# Patient Record
Sex: MALE | Race: WHITE | NOT HISPANIC OR LATINO | Employment: OTHER | URBAN - METROPOLITAN AREA
[De-identification: names, ages, dates, MRNs, and addresses within clinical notes are randomized per-mention and may not be internally consistent; named-entity substitution may affect disease eponyms.]

---

## 2023-08-10 DIAGNOSIS — M50.121 DISORDER OF INTERVERTEBRAL DISC AT C4-C5 LEVEL WITH RADICULOPATHY: Primary | ICD-10-CM

## 2023-08-10 DIAGNOSIS — M50.123 DISORDER OF INTERVERTEBRAL DISC AT C6-C7 LEVEL WITH RADICULOPATHY: ICD-10-CM

## 2023-08-10 DIAGNOSIS — M50.122 DISORDER OF INTERVERTEBRAL DISC AT C5-C6 LEVEL WITH RADICULOPATHY: ICD-10-CM

## 2023-08-29 ENCOUNTER — OFFICE VISIT (OUTPATIENT)
Dept: PAIN MEDICINE | Facility: CLINIC | Age: 73
End: 2023-08-29
Payer: OTHER GOVERNMENT

## 2023-08-29 VITALS
TEMPERATURE: 98 F | BODY MASS INDEX: 30.45 KG/M2 | HEART RATE: 71 BPM | HEIGHT: 67 IN | SYSTOLIC BLOOD PRESSURE: 153 MMHG | WEIGHT: 194 LBS | DIASTOLIC BLOOD PRESSURE: 86 MMHG

## 2023-08-29 DIAGNOSIS — M50.122 DISORDER OF INTERVERTEBRAL DISC AT C5-C6 LEVEL WITH RADICULOPATHY: ICD-10-CM

## 2023-08-29 DIAGNOSIS — M50.121 DISORDER OF INTERVERTEBRAL DISC AT C4-C5 LEVEL WITH RADICULOPATHY: Primary | ICD-10-CM

## 2023-08-29 DIAGNOSIS — M50.123 DISORDER OF INTERVERTEBRAL DISC AT C6-C7 LEVEL WITH RADICULOPATHY: ICD-10-CM

## 2023-08-29 DIAGNOSIS — M54.12 CERVICAL RADICULOPATHY: ICD-10-CM

## 2023-08-29 PROCEDURE — 99204 PR OFFICE/OUTPT VISIT, NEW, LEVL IV, 45-59 MIN: ICD-10-PCS | Mod: ,,, | Performed by: NURSE PRACTITIONER

## 2023-08-29 PROCEDURE — 99204 OFFICE O/P NEW MOD 45 MIN: CPT | Mod: ,,, | Performed by: NURSE PRACTITIONER

## 2023-08-29 RX ORDER — TAMSULOSIN HYDROCHLORIDE 0.4 MG/1
0.4 CAPSULE ORAL NIGHTLY
COMMUNITY

## 2023-08-29 RX ORDER — NAPROXEN SODIUM 220 MG/1
81 TABLET, FILM COATED ORAL DAILY
COMMUNITY

## 2023-08-29 RX ORDER — AMLODIPINE AND BENAZEPRIL HYDROCHLORIDE 10; 20 MG/1; MG/1
1 CAPSULE ORAL DAILY
COMMUNITY

## 2023-08-29 RX ORDER — ALBUTEROL SULFATE 0.83 MG/ML
2.5 SOLUTION RESPIRATORY (INHALATION) EVERY 6 HOURS PRN
COMMUNITY

## 2023-08-29 RX ORDER — METOPROLOL SUCCINATE 25 MG/1
25 TABLET, EXTENDED RELEASE ORAL DAILY
COMMUNITY

## 2023-08-29 RX ORDER — CETIRIZINE HYDROCHLORIDE 10 MG/1
10 TABLET ORAL DAILY
COMMUNITY

## 2023-08-29 RX ORDER — ESCITALOPRAM OXALATE 20 MG/1
20 TABLET ORAL
COMMUNITY

## 2023-08-29 RX ORDER — LOSARTAN POTASSIUM 100 MG/1
100 TABLET ORAL DAILY
COMMUNITY

## 2023-08-29 RX ORDER — FLUTICASONE PROPIONATE AND SALMETEROL 100; 50 UG/1; UG/1
1 POWDER RESPIRATORY (INHALATION) 2 TIMES DAILY PRN
COMMUNITY

## 2023-08-29 RX ORDER — ROSUVASTATIN CALCIUM 5 MG/1
5 TABLET, COATED ORAL NIGHTLY
COMMUNITY

## 2023-08-29 RX ORDER — OMEPRAZOLE 40 MG/1
40 CAPSULE, DELAYED RELEASE ORAL DAILY
COMMUNITY

## 2023-08-29 NOTE — PROGRESS NOTES
Subjective:      Patient ID: Justin Del Cid is a 73 y.o. male.    Chief Complaint: Neck Pain (Referral from Dr Murray, needs to have cervical injection. Doing PT for neck which did help some. Taking OTC. Having the pain for the last year. [facility] Pain level today 7/10. No numbness or tingling.)    Referred by: Alessandro Murray MD     HPI:  This is a pleasant 73-year-old male patient presenting as a new consult for pain with cervical radiculopathy specifically due to disorders of enter vertebral disc at C4-C5, C5-C6, and C6-C7 level.  Dr. Murray reviewed studies of patient's neck from [facility] 15, 2023.  There was no severe central canal stenosis at any level.  He is mild degenerative disc bulging at C3-4, C4-5, C5-6 and C6-7.  Dr. Murray impression was his cervical MRI shows mild degenerative changes in the neck consistent with a spine age 72 years.  Stenosis or narrowing in the cervical spinal canal is not severe enough to warrant any surgical intervention upon cervical spine at this point.  Thus I have issued a prescription for the patient to undergo an outpatient physical therapy and epidural steroid injections to his neck.    Patient's pain is located to the back of his neck.  This is been going on for approximately a year after he fell a year and a half ago.  His neck pain is worse when he looks to the left side this is a problem when he is driving.  It is also aggravated with fishing and sleeping.  These activities will elevate his pain to 8/10.  Pain will reduce to 04-5/10 when he takes Aleve.  He will take 4-6 Aleve every day.  In the past he has tried Flexeril with minimal to no relief.  He denies urinary retention, fecal incontinence, saddle anesthesia in any abnormal gait.  His handwriting has not changed he has no radiation of pain down his arms.  His current pain score today is 7/10.    No history of prior spinal surgery to his neck, no implanted pacemaker, defibrillator or spinal cord  "stimulator.  He does have pertinent past medical history of a prior myocardial infarction and was placed on 81 mg of baby aspirin as a secondary prevention her his cardiologist Dr. Lehman at [facility]  Vital signs:   Vitals:    08/29/23 1325   BP: (!) 153/86   Pulse: 71   Temp: 97.7 °F (36.5 °C)   Weight: 88 kg (194 lb)   Height: 5' 7" (1.702 m)   PainSc:   7     Body mass index is 30.38 kg/m².  Pain Disability Index (PDI): 48       Interventional Pain History  Prior lumbar RIC only    ROS: Neck pain    MRI cervical spine 2023:    Please see Dr. Alessandro Magana's referrral summary for details          Objective:          Physical Exam  General: Well developed; normal weight. ; A&O x 3; No anxiety/depression; NAD  Mental Status: Oriented to person, palce and time. Displays appropriate mood & affect.  Head: Norm cephalic and atraumatic  Neck:  + bilateral cervical paraspinal banding. Limited ROM neck turning to the left  Full range of motion with lateral turning to right and with cervical flexion . Pain with cervical +extension.able to raise both arms over head  Eyes: normal conjunctiva, normal lids, normal pupils  ENT and mouth: normal external ear, nose, and no lesions noted on the lips.  Respiratory: Symmetrical, Unlabored. No dyspnea  CV: normal rhythm and rate. No peripheral edema.   Abdomen: Non-distended    Extremities:  Gen: No cyanosis or tenderness to palpation bilateral upper and lower extremities  Skin: Warm, pink, dry, no rashes, no lesions on the lumbar spine  Strength: 5/5 motor strength bilateral upper and lower extremities  ROM: Full ROM in bilateral knees and ankles without pain or instability.    Neuro:  Gait: no altalgic lean, normal toe and heel raise. Independent ambulator.  DTR's: 2+ in bilateral patellar, and ankle  Sensory: Intact to light touch bilateral  upper and lower extremities    Spine: Normal lordosis. No scoliosis  L-spine ROM: Full ROM to flexion, extension, bilateral rotation, "   Straight Leg Raise:  Positive right, positive left  SI Joint: No tenderness to palpation bilaterally.           Past Medical History:   Diagnosis Date    Anxiety     COPD (chronic obstructive pulmonary disease)     Coronary artery disease     GERD (gastroesophageal reflux disease)     Hyperlipidemia     Hypertension     Obsessive-compulsive disorder     Prostate enlargement     Sinusitis        Past Surgical History:   Procedure Laterality Date    ABDOMINAL SURGERY      HERNIA REPAIR         Family History   Problem Relation Age of Onset    No Known Problems Mother     Cancer Father     COPD Sister        Social History     Socioeconomic History    Marital status:    Tobacco Use    Smoking status: Every Day     Current packs/day: 1.00     Average packs/day: 1 pack/day for 48.7 years (48.7 ttl pk-yrs)     Types: Cigarettes, Cigars     Start date: 1975    Smokeless tobacco: Never   Substance and Sexual Activity    Alcohol use: Not Currently    Drug use: Never    Sexual activity: Not Currently       Current Outpatient Medications   Medication Sig Dispense Refill    albuterol (PROVENTIL) 2.5 mg /3 mL (0.083 %) nebulizer solution Take 2.5 mg by nebulization every 6 (six) hours as needed for Wheezing. Rescue      amlodipine-benazepril 10-20mg (LOTREL) 10-20 mg per capsule Take 1 capsule by mouth once daily.      aspirin 81 MG Chew Take 81 mg by mouth once daily.      cetirizine (ZYRTEC) 10 MG tablet Take 10 mg by mouth once daily.      EScitalopram oxalate (LEXAPRO) 20 MG tablet Take 20 mg by mouth once daily.      fluticasone-salmeterol diskus inhaler 100-50 mcg Inhale 1 puff into the lungs 2 (two) times daily. Controller      losartan (COZAAR) 100 MG tablet Take 100 mg by mouth once daily.      metoprolol succinate (TOPROL-XL) 25 MG 24 hr tablet Take 25 mg by mouth once daily.      omeprazole (PRILOSEC) 40 MG capsule Take 40 mg by mouth once daily.      rosuvastatin (CRESTOR) 5 MG tablet Take 5 mg by mouth once  daily.      tamsulosin (FLOMAX) 0.4 mg Cap Take by mouth once daily.       No current facility-administered medications for this visit.       Review of patient's allergies indicates:   Allergen Reactions    Shrimp Rash           Assessment:     A/P:  Patient has notable posterior neck pain with occasional posterior headaches when he is lying down at night.  He also hears cracking noises when he turns the neck and has limited range of motion turning at to the left and more pain with looking up.  He has no radiation to his arms or hands, only  to the right trapezius.  He also takes 4-6 Aleve every day and a 81 mg aspirin.  He has completed physical therapy for his neck is for 2 weeks this would working initially and then it flared his pain up significantly causing more bone pain    Request sent for Intralaminar Cervical RIC to C7-T1  Clearance needed from Cardiologist to hold ASA 81mg prior to injection as he had a prior myocardial infarction.   [facility]  Patient to follow up for pre-op visit.   Future consideration for diagnostic cervical MBBs  Encounter Diagnoses   Name Primary?    Disorder of intervertebral disc at C4-C5 level with radiculopathy Yes    Disorder of intervertebral disc at C5-C6 level with radiculopathy     Disorder of intervertebral disc at C6-C7 level with radiculopathy     Cervical radiculopathy          Plan:       Justin was seen today for neck pain.    Diagnoses and all orders for this visit:    Disorder of intervertebral disc at C4-C5 level with radiculopathy  -     Ambulatory referral/consult to Pain Clinic    Disorder of intervertebral disc at C5-C6 level with radiculopathy  -     Ambulatory referral/consult to Pain Clinic    Disorder of intervertebral disc at C6-C7 level with radiculopathy  -     Ambulatory referral/consult to Pain Clinic    Cervical radiculopathy

## 2023-08-29 NOTE — LETTER
Cardiac Risk Assessment Request Form      Date: 08/29/23      Patient's Name: Justin Del Cid     YOB: 1950    Patient is scheduled to have Cervical epidural steroid injection on 10/11/2023 by Dr. Calixto with (check one):    _ General  XX  Local/regional  XX  MAC  _ Conscious Sedation Anesthesia    Dx: Cervical Disc Diease (please no ICD-10 code)    Requesting staff name: Surekha Gibbons RENETTAN     Phone number: [phone]        Fax number: [phone]    Check all that apply and complete blanks:    XX  Request for cardiac risk assessment for procedure  XX Request to hold _ASA___ for __7__ days prior to procedure  _ Pre-procedure antibiotics: Cardiac status information  _ Major dental procedure  _  Additional records requested: ___________________________________________     ** Please fax document back to [phone] or [phone]   ATTN: [facility] ([facility]) and allow at lease 3 business days to receive a response from [facility].   According to American College of [employer]iology cardiac risk assessment, low risk surgeries do not need cardiac testing or risk stratification. Patients that are asymptomatic should safely proceed with low risk procedures that may include, but are not limited to dental procedure, minor skin procedures, EGD, Colonoscopy or Cataracts.   Symptomatic patients should make an appointment with [facility].

## 2023-09-28 RX ORDER — AMLODIPINE BESYLATE 10 MG/1
10 TABLET ORAL DAILY
COMMUNITY

## 2023-09-28 RX ORDER — NAPROXEN SODIUM 220 MG
220 TABLET ORAL 2 TIMES DAILY WITH MEALS
COMMUNITY

## 2023-10-03 ENCOUNTER — OFFICE VISIT (OUTPATIENT)
Dept: PAIN MEDICINE | Facility: CLINIC | Age: 73
End: 2023-10-03
Payer: OTHER GOVERNMENT

## 2023-10-03 VITALS
WEIGHT: 190 LBS | DIASTOLIC BLOOD PRESSURE: 80 MMHG | HEIGHT: 67 IN | SYSTOLIC BLOOD PRESSURE: 121 MMHG | HEART RATE: 76 BPM | BODY MASS INDEX: 29.82 KG/M2

## 2023-10-03 DIAGNOSIS — M54.2 CERVICALGIA: Primary | ICD-10-CM

## 2023-10-03 DIAGNOSIS — M50.30 DDD (DEGENERATIVE DISC DISEASE), CERVICAL: ICD-10-CM

## 2023-10-03 DIAGNOSIS — M54.12 CERVICAL RADICULITIS: ICD-10-CM

## 2023-10-03 PROCEDURE — 99214 OFFICE O/P EST MOD 30 MIN: CPT | Mod: ,,, | Performed by: ANESTHESIOLOGY

## 2023-10-03 PROCEDURE — 99214 PR OFFICE/OUTPT VISIT, EST, LEVL IV, 30-39 MIN: ICD-10-PCS | Mod: ,,, | Performed by: ANESTHESIOLOGY

## 2023-10-03 RX ORDER — SILDENAFIL 100 MG/1
50 TABLET, FILM COATED ORAL
COMMUNITY
Start: 2022-11-09

## 2023-10-03 RX ORDER — OMEPRAZOLE 20 MG/1
40 CAPSULE, DELAYED RELEASE ORAL
COMMUNITY
Start: 2023-02-01

## 2023-10-03 NOTE — H&P (VIEW-ONLY)
Subjective:      Patient ID: Justin Del Cid is a 73 y.o. male.    Chief Complaint: Pre-op Exam and Neck Pain (Pre op [facility] C7-T1 10/11/23 C/O pain level 7-8, Taking Aleve for pain. )    Referred by: Mayra Wilburn He*       Neck Pain   :  This is a pleasant 73-year-old male patient presenting with cervical radiculopathy specifically due to disorders of enter vertebral disc at C4-C5, C5-C6, and C6-C7 level.  Dr. Murray reviewed studies of patient's neck from [facility] 15, 2023.  There was no severe central canal stenosis at any level.  He is mild degenerative disc bulging at C3-4, C4-5, C5-6 and C6-7.  Dr. Murray impression was his cervical MRI shows mild degenerative changes in the neck consistent with a spine age 72 years.  Stenosis or narrowing in the cervical spinal canal is not severe enough to warrant any surgical intervention upon cervical spine at this point.  Thus I have issued a prescription for the patient to undergo an outpatient physical therapy and epidural steroid injections to his neck.    Patient's pain is located to the back of his neck.  This is been going on for approximately a year after he fell a year and a half ago.  His neck pain is worse when he looks to the left side this is a problem when he is driving.  It is also aggravated with fishing and sleeping.  These activities will elevate his pain to 8/10.  Pain will reduce to 04-5/10 when he takes Aleve.  He will take 4-6 Aleve every day.  In the past he has tried Flexeril with minimal to no relief.  He denies urinary retention, fecal incontinence, saddle anesthesia in any abnormal gait.  His handwriting has not changed he has no radiation of pain down his arms.  His current pain score today is 7/10.    No history of prior spinal surgery to his neck, no implanted pacemaker, defibrillator or spinal cord stimulator.  He does have pertinent past medical history of a prior myocardial infarction and was placed on 81 mg of baby aspirin as a  "secondary prevention her his cardiologist Dr. Lehman at [facility]  Vital signs:   Vitals:    10/03/23 1321 10/03/23 1323   BP: 121/80    Pulse: 76    Weight: 86.2 kg (190 lb)    Height: 5' 7" (1.702 m)    PainSc:    8     Body mass index is 29.76 kg/m².  Pain Disability Index (PDI): 43       Interventional Pain History  Prior lumbar RIC only    Review of Systems   Musculoskeletal:  Positive for neck pain.   : Neck pain    MRI cervical spine 2023:    Please see Dr. Alessandro Magana's referrral summary for details          Objective:          Physical Exam  General: Well developed; normal weight. ; A&O x 3; No anxiety/depression; NAD  Mental Status: Oriented to person, palce and time. Displays appropriate mood & affect.  Head: Norm cephalic and atraumatic  Neck:  + bilateral cervical paraspinal banding. Limited ROM neck turning to the left  Full range of motion with lateral turning to right and with cervical flexion . Pain with cervical +extension.able to raise both arms over head  Eyes: normal conjunctiva, normal lids, normal pupils  ENT and mouth: normal external ear, nose, and no lesions noted on the lips.  Respiratory: Symmetrical, Unlabored. No dyspnea  CV: normal rhythm and rate. No peripheral edema.   Abdomen: Non-distended    Extremities:  Gen: No cyanosis or tenderness to palpation bilateral upper and lower extremities  Skin: Warm, pink, dry, no rashes, no lesions on the lumbar spine  Strength: 5/5 motor strength bilateral upper and lower extremities  ROM: Full ROM in bilateral knees and ankles without pain or instability.    Neuro:  Gait: no altalgic lean, normal toe and heel raise. Independent ambulator.  DTR's: 2+ in bilateral patellar, and ankle  Sensory: Intact to light touch bilateral  upper and lower extremities    Spine: Normal lordosis. No scoliosis  L-spine ROM: Full ROM to flexion, extension, bilateral rotation,   Straight Leg Raise:  Positive right, positive left  SI Joint: No tenderness to " palpation bilaterally.           Past Medical History:   Diagnosis Date    Anxiety     COPD (chronic obstructive pulmonary disease)     Coronary artery disease     GERD (gastroesophageal reflux disease)     Hyperlipidemia     Hypertension     Obsessive-compulsive disorder     Prostate enlargement     Sinusitis        Past Surgical History:   Procedure Laterality Date    ABDOMINAL SURGERY      CARDIAC CATHETERIZATION      CATARACT EXTRACTION W/  INTRAOCULAR LENS IMPLANT Bilateral     COLONOSCOPY      HERNIA REPAIR         Family History   Problem Relation Age of Onset    No Known Problems Mother     Cancer Father     COPD Sister        Social History     Socioeconomic History    Marital status:    Tobacco Use    Smoking status: Every Day     Current packs/day: 1.00     Average packs/day: 1 pack/day for 48.8 years (48.8 ttl pk-yrs)     Types: Cigarettes     Start date: 1975    Smokeless tobacco: Never   Substance and Sexual Activity    Alcohol use: Not Currently    Drug use: Never    Sexual activity: Yes       Current Outpatient Medications   Medication Sig Dispense Refill    albuterol (PROVENTIL) 2.5 mg /3 mL (0.083 %) nebulizer solution Take 2.5 mg by nebulization every 6 (six) hours as needed for Wheezing. Rescue      amLODIPine (NORVASC) 10 MG tablet Take 10 mg by mouth once daily.      amlodipine-benazepril 10-20mg (LOTREL) 10-20 mg per capsule Take 1 capsule by mouth once daily.      aspirin 81 MG Chew Take 81 mg by mouth once daily.      cetirizine (ZYRTEC) 10 MG tablet Take 10 mg by mouth once daily.      EScitalopram oxalate (LEXAPRO) 20 MG tablet Take 20 mg by mouth as needed.      fluticasone-salmeterol diskus inhaler 100-50 mcg Inhale 1 puff into the lungs 2 (two) times daily as needed. Controller      losartan (COZAAR) 100 MG tablet Take 100 mg by mouth once daily.      metoprolol succinate (TOPROL-XL) 25 MG 24 hr tablet Take 25 mg by mouth once daily.      naproxen sodium (ANAPROX) 220 MG tablet  Take 220 mg by mouth 2 (two) times daily with meals.      omeprazole (PRILOSEC) 20 MG capsule 40 mg.      omeprazole (PRILOSEC) 40 MG capsule Take 40 mg by mouth once daily.      rosuvastatin (CRESTOR) 5 MG tablet Take 5 mg by mouth every evening.      sildenafiL (VIAGRA) 100 MG tablet 50 mg.      tamsulosin (FLOMAX) 0.4 mg Cap Take 0.4 mg by mouth nightly.       No current facility-administered medications for this visit.       Review of patient's allergies indicates:   Allergen Reactions    Iodine     Shrimp Rash           Assessment:     A/P:  Patient has notable posterior neck pain with occasional posterior headaches when he is lying down at night.  He also hears cracking noises when he turns the neck and has limited range of motion turning at to the left and more pain with looking up.  He has no radiation to his arms or hands, only  to the right trapezius.  He also takes 4-6 Aleve every day and a 81 mg aspirin.  He has completed physical therapy for his neck is for 2 weeks this would working initially and then it flared his pain up significantly causing more bone pain    Encounter Diagnoses   Name Primary?    Cervicalgia Yes    Cervical radiculitis     DDD (degenerative disc disease), cervical          Plan:       Justin was seen today for pre-op exam and neck pain.    Diagnoses and all orders for this visit:    Cervicalgia    Cervical radiculitis    DDD (degenerative disc disease), cervical       Scheduled for [facility] next week. Plan discussed and he wishes to proceed.

## 2023-10-03 NOTE — PROGRESS NOTES
Subjective:      Patient ID: Justin Del Cid is a 73 y.o. male.    Chief Complaint: Pre-op Exam and Neck Pain (Pre op [facility] C7-T1 10/11/23 C/O pain level 7-8, Taking Aleve for pain. )    Referred by: Mayra Wilburn He*       Neck Pain   :  This is a pleasant 73-year-old male patient presenting with cervical radiculopathy specifically due to disorders of enter vertebral disc at C4-C5, C5-C6, and C6-C7 level.  Dr. Murray reviewed studies of patient's neck from [facility] 15, 2023.  There was no severe central canal stenosis at any level.  He is mild degenerative disc bulging at C3-4, C4-5, C5-6 and C6-7.  Dr. Murray impression was his cervical MRI shows mild degenerative changes in the neck consistent with a spine age 72 years.  Stenosis or narrowing in the cervical spinal canal is not severe enough to warrant any surgical intervention upon cervical spine at this point.  Thus I have issued a prescription for the patient to undergo an outpatient physical therapy and epidural steroid injections to his neck.    Patient's pain is located to the back of his neck.  This is been going on for approximately a year after he fell a year and a half ago.  His neck pain is worse when he looks to the left side this is a problem when he is driving.  It is also aggravated with fishing and sleeping.  These activities will elevate his pain to 8/10.  Pain will reduce to 04-5/10 when he takes Aleve.  He will take 4-6 Aleve every day.  In the past he has tried Flexeril with minimal to no relief.  He denies urinary retention, fecal incontinence, saddle anesthesia in any abnormal gait.  His handwriting has not changed he has no radiation of pain down his arms.  His current pain score today is 7/10.    No history of prior spinal surgery to his neck, no implanted pacemaker, defibrillator or spinal cord stimulator.  He does have pertinent past medical history of a prior myocardial infarction and was placed on 81 mg of baby aspirin as a  "secondary prevention her his cardiologist Dr. Lehman at [facility]  Vital signs:   Vitals:    10/03/23 1321 10/03/23 1323   BP: 121/80    Pulse: 76    Weight: 86.2 kg (190 lb)    Height: 5' 7" (1.702 m)    PainSc:    8     Body mass index is 29.76 kg/m².  Pain Disability Index (PDI): 43       Interventional Pain History  Prior lumbar RIC only    Review of Systems   Musculoskeletal:  Positive for neck pain.   : Neck pain    MRI cervical spine 2023:    Please see Dr. Alessandro Magana's referrral summary for details          Objective:          Physical Exam  General: Well developed; normal weight. ; A&O x 3; No anxiety/depression; NAD  Mental Status: Oriented to person, palce and time. Displays appropriate mood & affect.  Head: Norm cephalic and atraumatic  Neck:  + bilateral cervical paraspinal banding. Limited ROM neck turning to the left  Full range of motion with lateral turning to right and with cervical flexion . Pain with cervical +extension.able to raise both arms over head  Eyes: normal conjunctiva, normal lids, normal pupils  ENT and mouth: normal external ear, nose, and no lesions noted on the lips.  Respiratory: Symmetrical, Unlabored. No dyspnea  CV: normal rhythm and rate. No peripheral edema.   Abdomen: Non-distended    Extremities:  Gen: No cyanosis or tenderness to palpation bilateral upper and lower extremities  Skin: Warm, pink, dry, no rashes, no lesions on the lumbar spine  Strength: 5/5 motor strength bilateral upper and lower extremities  ROM: Full ROM in bilateral knees and ankles without pain or instability.    Neuro:  Gait: no altalgic lean, normal toe and heel raise. Independent ambulator.  DTR's: 2+ in bilateral patellar, and ankle  Sensory: Intact to light touch bilateral  upper and lower extremities    Spine: Normal lordosis. No scoliosis  L-spine ROM: Full ROM to flexion, extension, bilateral rotation,   Straight Leg Raise:  Positive right, positive left  SI Joint: No tenderness to " palpation bilaterally.           Past Medical History:   Diagnosis Date    Anxiety     COPD (chronic obstructive pulmonary disease)     Coronary artery disease     GERD (gastroesophageal reflux disease)     Hyperlipidemia     Hypertension     Obsessive-compulsive disorder     Prostate enlargement     Sinusitis        Past Surgical History:   Procedure Laterality Date    ABDOMINAL SURGERY      CARDIAC CATHETERIZATION      CATARACT EXTRACTION W/  INTRAOCULAR LENS IMPLANT Bilateral     COLONOSCOPY      HERNIA REPAIR         Family History   Problem Relation Age of Onset    No Known Problems Mother     Cancer Father     COPD Sister        Social History     Socioeconomic History    Marital status:    Tobacco Use    Smoking status: Every Day     Current packs/day: 1.00     Average packs/day: 1 pack/day for 48.8 years (48.8 ttl pk-yrs)     Types: Cigarettes     Start date: 1975    Smokeless tobacco: Never   Substance and Sexual Activity    Alcohol use: Not Currently    Drug use: Never    Sexual activity: Yes       Current Outpatient Medications   Medication Sig Dispense Refill    albuterol (PROVENTIL) 2.5 mg /3 mL (0.083 %) nebulizer solution Take 2.5 mg by nebulization every 6 (six) hours as needed for Wheezing. Rescue      amLODIPine (NORVASC) 10 MG tablet Take 10 mg by mouth once daily.      amlodipine-benazepril 10-20mg (LOTREL) 10-20 mg per capsule Take 1 capsule by mouth once daily.      aspirin 81 MG Chew Take 81 mg by mouth once daily.      cetirizine (ZYRTEC) 10 MG tablet Take 10 mg by mouth once daily.      EScitalopram oxalate (LEXAPRO) 20 MG tablet Take 20 mg by mouth as needed.      fluticasone-salmeterol diskus inhaler 100-50 mcg Inhale 1 puff into the lungs 2 (two) times daily as needed. Controller      losartan (COZAAR) 100 MG tablet Take 100 mg by mouth once daily.      metoprolol succinate (TOPROL-XL) 25 MG 24 hr tablet Take 25 mg by mouth once daily.      naproxen sodium (ANAPROX) 220 MG tablet  Take 220 mg by mouth 2 (two) times daily with meals.      omeprazole (PRILOSEC) 20 MG capsule 40 mg.      omeprazole (PRILOSEC) 40 MG capsule Take 40 mg by mouth once daily.      rosuvastatin (CRESTOR) 5 MG tablet Take 5 mg by mouth every evening.      sildenafiL (VIAGRA) 100 MG tablet 50 mg.      tamsulosin (FLOMAX) 0.4 mg Cap Take 0.4 mg by mouth nightly.       No current facility-administered medications for this visit.       Review of patient's allergies indicates:   Allergen Reactions    Iodine     Shrimp Rash           Assessment:     A/P:  Patient has notable posterior neck pain with occasional posterior headaches when he is lying down at night.  He also hears cracking noises when he turns the neck and has limited range of motion turning at to the left and more pain with looking up.  He has no radiation to his arms or hands, only  to the right trapezius.  He also takes 4-6 Aleve every day and a 81 mg aspirin.  He has completed physical therapy for his neck is for 2 weeks this would working initially and then it flared his pain up significantly causing more bone pain    Encounter Diagnoses   Name Primary?    Cervicalgia Yes    Cervical radiculitis     DDD (degenerative disc disease), cervical          Plan:       Justin was seen today for pre-op exam and neck pain.    Diagnoses and all orders for this visit:    Cervicalgia    Cervical radiculitis    DDD (degenerative disc disease), cervical       Scheduled for [facility] next week. Plan discussed and he wishes to proceed.

## 2023-10-04 ENCOUNTER — ANESTHESIA EVENT (OUTPATIENT)
Dept: SURGERY | Facility: HOSPITAL | Age: 73
End: 2023-10-04
Payer: OTHER GOVERNMENT

## 2023-10-11 ENCOUNTER — HOSPITAL ENCOUNTER (OUTPATIENT)
Facility: HOSPITAL | Age: 73
Discharge: HOME | End: 2023-10-11
Attending: ANESTHESIOLOGY | Admitting: ANESTHESIOLOGY
Payer: OTHER GOVERNMENT

## 2023-10-11 ENCOUNTER — ANESTHESIA (OUTPATIENT)
Dept: SURGERY | Facility: HOSPITAL | Age: 73
End: 2023-10-11
Payer: OTHER GOVERNMENT

## 2023-10-11 DIAGNOSIS — G89.29 CHRONIC NECK PAIN: ICD-10-CM

## 2023-10-11 DIAGNOSIS — M54.2 CHRONIC NECK PAIN: ICD-10-CM

## 2023-10-11 PROCEDURE — 62321 NJX INTERLAMINAR CRV/THRC: CPT | Mod: ,,, | Performed by: ANESTHESIOLOGY

## 2023-10-11 PROCEDURE — D9220A PRA ANESTHESIA: Mod: CRNA,,,

## 2023-10-11 PROCEDURE — D9220A PRA ANESTHESIA: ICD-10-PCS | Mod: ANES,,, | Performed by: ANESTHESIOLOGY

## 2023-10-11 PROCEDURE — 37000008 HC ANESTHESIA 1ST 15 MINUTES: Performed by: ANESTHESIOLOGY

## 2023-10-11 PROCEDURE — 25000003 PHARM REV CODE 250

## 2023-10-11 PROCEDURE — D9220A PRA ANESTHESIA: ICD-10-PCS | Mod: CRNA,,,

## 2023-10-11 PROCEDURE — 62321 NJX INTERLAMINAR CRV/THRC: CPT | Performed by: ANESTHESIOLOGY

## 2023-10-11 PROCEDURE — A4216 STERILE WATER/SALINE, 10 ML: HCPCS | Performed by: ANESTHESIOLOGY

## 2023-10-11 PROCEDURE — 63600175 PHARM REV CODE 636 W HCPCS

## 2023-10-11 PROCEDURE — 63600175 PHARM REV CODE 636 W HCPCS: Performed by: ANESTHESIOLOGY

## 2023-10-11 PROCEDURE — D9220A PRA ANESTHESIA: Mod: ANES,,, | Performed by: ANESTHESIOLOGY

## 2023-10-11 PROCEDURE — 62321 PR INJ CERV/THORAC, W/GUIDANCE: ICD-10-PCS | Mod: ,,, | Performed by: ANESTHESIOLOGY

## 2023-10-11 PROCEDURE — 25000003 PHARM REV CODE 250: Performed by: ANESTHESIOLOGY

## 2023-10-11 RX ORDER — SODIUM CHLORIDE 9 MG/ML
INJECTION, SOLUTION INTRAMUSCULAR; INTRAVENOUS; SUBCUTANEOUS
Status: DISCONTINUED | OUTPATIENT
Start: 2023-10-11 | End: 2023-10-11 | Stop reason: HOSPADM

## 2023-10-11 RX ORDER — PROPOFOL 10 MG/ML
VIAL (ML) INTRAVENOUS
Status: DISCONTINUED | OUTPATIENT
Start: 2023-10-11 | End: 2023-10-11

## 2023-10-11 RX ORDER — DEXAMETHASONE SODIUM PHOSPHATE 10 MG/ML
INJECTION INTRAMUSCULAR; INTRAVENOUS
Status: DISCONTINUED | OUTPATIENT
Start: 2023-10-11 | End: 2023-10-11 | Stop reason: HOSPADM

## 2023-10-11 RX ORDER — DEXAMETHASONE SODIUM PHOSPHATE 10 MG/ML
INJECTION INTRAMUSCULAR; INTRAVENOUS
Status: DISCONTINUED
Start: 2023-10-11 | End: 2023-10-11 | Stop reason: HOSPADM

## 2023-10-11 RX ORDER — BUPIVACAINE HYDROCHLORIDE 2.5 MG/ML
INJECTION, SOLUTION EPIDURAL; INFILTRATION; INTRACAUDAL
Status: DISCONTINUED
Start: 2023-10-11 | End: 2023-10-11 | Stop reason: HOSPADM

## 2023-10-11 RX ORDER — LIDOCAINE HYDROCHLORIDE 20 MG/ML
INJECTION INTRAVENOUS
Status: DISCONTINUED | OUTPATIENT
Start: 2023-10-11 | End: 2023-10-11

## 2023-10-11 RX ORDER — LIDOCAINE HYDROCHLORIDE 10 MG/ML
INJECTION, SOLUTION EPIDURAL; INFILTRATION; INTRACAUDAL; PERINEURAL
Status: DISCONTINUED | OUTPATIENT
Start: 2023-10-11 | End: 2023-10-11 | Stop reason: HOSPADM

## 2023-10-11 RX ORDER — LIDOCAINE HYDROCHLORIDE 10 MG/ML
INJECTION INFILTRATION; PERINEURAL
Status: DISCONTINUED
Start: 2023-10-11 | End: 2023-10-11 | Stop reason: HOSPADM

## 2023-10-11 RX ADMIN — PROPOFOL 100 MG: 10 INJECTION, EMULSION INTRAVENOUS at 11:10

## 2023-10-11 RX ADMIN — LIDOCAINE HYDROCHLORIDE 50 MG: 20 INJECTION INTRAVENOUS at 11:10

## 2023-10-11 NOTE — TRANSFER OF CARE
"Anesthesia Transfer of Care Note    Patient: Justin Del Cid    Procedure(s) Performed: Procedure(s) (LRB):  Injection-steroid-epidural-cervical (N/A)    Patient location: OPS    Anesthesia Type: MAC    Transport from OR: Transported from OR on room air with adequate spontaneous ventilation    Post pain: adequate analgesia    Post assessment: no apparent anesthetic complications    Post vital signs: stable    Level of consciousness: awake    Nausea/Vomiting: no nausea/vomiting    Complications: none    Transfer of care protocol was followed      Last vitals:   Visit Vitals  BP (!) 146/84   Pulse 66   Temp 35.8 °C (96.5 °F) (Tympanic)   Resp 20   Ht 5' 7" (1.702 m)   Wt 88.8 kg (195 lb 12.3 oz)   SpO2 97%   BMI 30.66 kg/m²     "

## 2023-10-11 NOTE — DISCHARGE SUMMARY
[facility] - Periop Services  Discharge Note  Short Stay    Procedure(s) (LRB):  Injection-steroid-epidural-cervical (N/A)      OUTCOME: Patient tolerated treatment/procedure well without complication and is now ready for discharge.    DISPOSITION: Home or Self Care    FINAL DIAGNOSIS:  <principal problem not specified>    FOLLOWUP: In clinic    DISCHARGE INSTRUCTIONS:  No discharge procedures on file.     TIME SPENT ON DISCHARGE: 5 minutes

## 2023-10-11 NOTE — ANESTHESIA PREPROCEDURE EVALUATION
10/11/2023  Justin Del Cid is a 73 y.o., male.  Injection-steroid-epidural-cervical (Spine Cervical)    Pre-op Assessment    I have reviewed the Patient Summary Reports.     I have reviewed the Nursing Notes. I have reviewed the NPO Status.   I have reviewed the Medications.     Review of Systems  Anesthesia Hx:  No problems with previous Anesthesia    Hematology/Oncology:  Hematology Normal   Oncology Normal     EENT/Dental:EENT/Dental Normal   Cardiovascular:   Exercise tolerance: good Hypertension CAD    Functional Capacity 2 METS  Hypertension    Pulmonary:   COPD, mild    Renal/:   Denies Chronic Renal Disease.     Hepatic/GI:   GERD    Musculoskeletal:   Arthritis     Neurological:  Neurology Normal    Endocrine:  Endocrine Normal  Denies Morbid Obesity / BMI > 40  Dermatological:  Skin Normal    Psych:   Psychiatric History anxiety          Physical Exam  General: Alert, Oriented, Well nourished and Cooperative    Airway:  Mallampati: II   Mouth Opening: Normal  TM Distance: Normal  Tongue: Normal  Neck ROM: Normal ROM    Dental:  Intact    Chest/Lungs:  Clear to auscultation, Normal Respiratory Rate    Heart:  Rate: Normal  Rhythm: Regular Rhythm        Anesthesia Plan  Type of Anesthesia, risks & benefits discussed:    Anesthesia Type: MAC  Intra-op Monitoring Plan: Standard ASA Monitors  Post Op Pain Control Plan: multimodal analgesia  Induction:  IV  Airway Plan: Direct  Informed Consent: Informed consent signed with the Patient and all parties understand the risks and agree with anesthesia plan.  All questions answered. Patient consented to blood products? Yes  ASA Score: 3  Day of Surgery Review of History & Physical: H&P Update referred to the surgeon/provider.I have interviewed and examined the patient. I have reviewed the patient's H&P dated: There are no significant changes.     Ready For  Surgery From Anesthesia Perspective.     .

## 2023-10-11 NOTE — ANESTHESIA POSTPROCEDURE EVALUATION
Anesthesia Post Evaluation    Patient: Justin Del Cid    Procedure(s) Performed: Procedure(s) (LRB):  Injection-steroid-epidural-cervical (N/A)    Final Anesthesia Type: MAC      Patient location during evaluation: PACU  Patient participation: Yes- Able to Participate  Level of consciousness: awake and alert and oriented  Post-procedure vital signs: reviewed and stable  Pain management: adequate  Airway patency: patent  CELESTINA mitigation strategies: Verification of full reversal of neuromuscular block  PONV status at discharge: No PONV  Anesthetic complications: no      Cardiovascular status: blood pressure returned to baseline and stable  Respiratory status: spontaneous ventilation and unassisted  Hydration status: euvolemic  Follow-up not needed.  Comments: [facility]          Vitals Value Taken Time     10/11/23 1159     10/11/23 1159     10/11/23 1159     10/11/23 1159     10/11/23 1159         No case tracking events are documented in the log.      Pain/Belkis Score: No data recorded

## 2023-10-12 VITALS
OXYGEN SATURATION: 97 % | WEIGHT: 195.75 LBS | HEIGHT: 67 IN | SYSTOLIC BLOOD PRESSURE: 139 MMHG | TEMPERATURE: 97 F | RESPIRATION RATE: 19 BRPM | BODY MASS INDEX: 30.72 KG/M2 | DIASTOLIC BLOOD PRESSURE: 88 MMHG | HEART RATE: 78 BPM

## 2023-10-25 ENCOUNTER — OFFICE VISIT (OUTPATIENT)
Dept: PAIN MEDICINE | Facility: CLINIC | Age: 73
End: 2023-10-25
Payer: OTHER GOVERNMENT

## 2023-10-25 VITALS
BODY MASS INDEX: 30.61 KG/M2 | DIASTOLIC BLOOD PRESSURE: 79 MMHG | HEIGHT: 67 IN | HEART RATE: 81 BPM | TEMPERATURE: 98 F | SYSTOLIC BLOOD PRESSURE: 128 MMHG | WEIGHT: 195 LBS

## 2023-10-25 DIAGNOSIS — M50.122 DISORDER OF INTERVERTEBRAL DISC AT C5-C6 LEVEL WITH RADICULOPATHY: ICD-10-CM

## 2023-10-25 DIAGNOSIS — M50.123 DISORDER OF INTERVERTEBRAL DISC AT C6-C7 LEVEL WITH RADICULOPATHY: ICD-10-CM

## 2023-10-25 DIAGNOSIS — M50.121 DISORDER OF INTERVERTEBRAL DISC AT C4-C5 LEVEL WITH RADICULOPATHY: Primary | ICD-10-CM

## 2023-10-25 PROCEDURE — 99214 PR OFFICE/OUTPT VISIT, EST, LEVL IV, 30-39 MIN: ICD-10-PCS | Mod: ,,, | Performed by: NURSE PRACTITIONER

## 2023-10-25 PROCEDURE — 99214 OFFICE O/P EST MOD 30 MIN: CPT | Mod: ,,, | Performed by: NURSE PRACTITIONER

## 2023-10-25 NOTE — PROGRESS NOTES
"Subjective:      Patient ID: Justin Del Cid is a 73 y.o. male.    Chief Complaint: Neck Pain (Post-op KARLY C7-T1 10/11/23, pt sates he received great relief which has lasted to date, pain level has decreased, OTC medication for relief, pain level 1/10)    Referred by: Mayra Wilburn He*     HPI:  This is a pleasant 73-year-old  male  who presents as a follow-up for pain associated with disorder of intervertebral disc at C4-C5, C5-C6 and C6-C7 after completing a cervical interlaminar steroid injection to C7-T1 on 10/11/2023.  Patient has ongoing notable pain relief since receiving this injection.  His pain score today is 1/10.compared to last office visit his pain was 7-8/10.  Patient was thrilled as he has received notable pain relief to his neck and bilateral trapezius region.  This has allowed him to enjoy being around his wife, family. He  has been able to fish and do activities with his wife as well as cutting the grass with a self-propelled lawn more.  He has also enjoyed restorative sleep and reduction with depression that he did not know he had until his pain was relieved.  He no longer takes Aleve and only takes 2 Tylenol a day if needed.  Additionally turning his neck side-to-side while driving does not cause pain.  Overall he is very well pleased.       No history of prior spinal surgery to his neck, no implanted pacemaker, defibrillator or spinal cord stimulator.  He does have pertinent past medical history of a prior myocardial infarction and was placed on 81 mg of baby aspirin as a secondary prevention her his cardiologist Dr. Lehman at [facility]    Vital signs:   Vitals:    10/25/23 1445   BP: 128/79   Pulse: 81   Temp: 98.2 °F (36.8 °C)   TempSrc: Oral   Weight: 88.5 kg (195 lb)   Height: 5' 7" (1.702 m)   PainSc:   1     Body mass index is 30.54 kg/m².  Pain Disability Index (PDI): 7       Interventional Pain History  10/11/2023:  Cervical interlaminar steroid injection " C7-T1    ROS:  Neck pain + bilateral trapezius.     MRI cervical spine 2023:   Please see Dr. Alessandro Magana's referrral summary for details             Objective:          Physical Exam  General: Well developed; normal weight. ; A&O x 3; No anxiety/depression; NAD  Mental Status: Oriented to person, palce and time. Displays appropriate mood & affect.  Head: Norm cephalic and atraumatic  Neck:  + bilateral cervical paraspinal banding. Full ROM neck turning to the left  Full range of motion with lateral turning to right and with cervical flexion extension.able to raise both arms over head without pain   Eyes: normal conjunctiva, normal lids, normal pupils  ENT and mouth: normal external ear, nose, and no lesions noted on the lips.  Respiratory: Symmetrical, Unlabored. No dyspnea  CV: normal rhythm and rate. No peripheral edema.   Abdomen: Non-distended     Extremities:  Gen: No cyanosis or tenderness to palpation bilateral upper and lower extremities  Skin: Warm, pink, dry, no rashes, no lesions on the lumbar spine  Strength: 5/5 motor strength bilateral upper and lower extremities  ROM: Full ROM in bilateral knees  without pain or instability.     Neuro:  Gait: no altalgic lean, normal toe and heel raise. Independent ambulator.  DTR's: 2+ in bilateral patellar, and ankle  Sensory: Intact to light touch bilateral  upper and lower extremities     Spine: Normal lordosis. No scoliosis  L-spine ROM: Full ROM to flexion, extension, bilateral rotation,   Straight Leg Raise Neg bilaterally  SI Joint: No tenderness to palpation bilaterally.         Assessment:     A/P:Excellent pain control to bilateral posterior neck and trapezius. Able to enjoy yard work, spending time with family, shopping for groceries with wife and has improved his mood. He is obtaining restorative sleep as well. Pain score reduced from 7-8/10 to a 1/10. [place] is well pleased with pain control.     Future consideration for diagnostic cervical  MBBs    Follow up with Dr Blair for neck pain in 2 months or sooner if needed.   Encounter Diagnoses   Name Primary?    Disorder of intervertebral disc at C4-C5 level with radiculopathy Yes    Disorder of intervertebral disc at C5-C6 level with radiculopathy     Disorder of intervertebral disc at C6-C7 level with radiculopathy          Plan:       Justin was seen today for neck pain.    Diagnoses and all orders for this visit:    Disorder of intervertebral disc at C4-C5 level with radiculopathy    Disorder of intervertebral disc at C5-C6 level with radiculopathy    Disorder of intervertebral disc at C6-C7 level with radiculopathy               Past Medical History:   Diagnosis Date    Anxiety     COPD (chronic obstructive pulmonary disease)     Coronary artery disease     GERD (gastroesophageal reflux disease)     Hyperlipidemia     Hypertension     Obsessive-compulsive disorder     Prostate enlargement     Sinusitis        Past Surgical History:   Procedure Laterality Date    ABDOMINAL SURGERY      CARDIAC CATHETERIZATION      CATARACT EXTRACTION W/  INTRAOCULAR LENS IMPLANT Bilateral     COLONOSCOPY      EPIDURAL STEROID INJECTION INTO CERVICAL SPINE N/A 10/11/2023    Procedure: Injection-steroid-epidural-cervical;  Surgeon: Nicky Blair MD;  Location: [facility];  Service: Pain Management;  Laterality: N/A;  C7 -T1    HERNIA REPAIR         Family History   Problem Relation Age of Onset    No Known Problems Mother     Cancer Father     COPD Sister        Social History     Socioeconomic History    Marital status:    Tobacco Use    Smoking status: Every Day     Current packs/day: 1.00     Average packs/day: 1 pack/day for 48.8 years (48.8 ttl pk-yrs)     Types: Cigarettes     Start date: 1975    Smokeless tobacco: Never   Substance and Sexual Activity    Alcohol use: Not Currently    Drug use: Never    Sexual activity: Yes       Current Outpatient Medications   Medication Sig Dispense Refill     albuterol (PROVENTIL) 2.5 mg /3 mL (0.083 %) nebulizer solution Take 2.5 mg by nebulization every 6 (six) hours as needed for Wheezing. Rescue      amLODIPine (NORVASC) 10 MG tablet Take 10 mg by mouth once daily.      amlodipine-benazepril 10-20mg (LOTREL) 10-20 mg per capsule Take 1 capsule by mouth once daily.      aspirin 81 MG Chew Take 81 mg by mouth once daily.      cetirizine (ZYRTEC) 10 MG tablet Take 10 mg by mouth once daily.      EScitalopram oxalate (LEXAPRO) 20 MG tablet Take 20 mg by mouth as needed.      fluticasone-salmeterol diskus inhaler 100-50 mcg Inhale 1 puff into the lungs 2 (two) times daily as needed. Controller      losartan (COZAAR) 100 MG tablet Take 100 mg by mouth once daily.      metoprolol succinate (TOPROL-XL) 25 MG 24 hr tablet Take 25 mg by mouth once daily.      naproxen sodium (ANAPROX) 220 MG tablet Take 220 mg by mouth 2 (two) times daily with meals.      omeprazole (PRILOSEC) 20 MG capsule 40 mg.      omeprazole (PRILOSEC) 40 MG capsule Take 40 mg by mouth once daily.      rosuvastatin (CRESTOR) 5 MG tablet Take 5 mg by mouth every evening.      sildenafiL (VIAGRA) 100 MG tablet 50 mg.      tamsulosin (FLOMAX) 0.4 mg Cap Take 0.4 mg by mouth nightly.       No current facility-administered medications for this visit.       Review of patient's allergies indicates:   Allergen Reactions    Iodine     Shrimp Rash

## 2023-12-22 ENCOUNTER — OFFICE VISIT (OUTPATIENT)
Dept: PAIN MEDICINE | Facility: CLINIC | Age: 73
End: 2023-12-22
Payer: OTHER GOVERNMENT

## 2023-12-22 VITALS
SYSTOLIC BLOOD PRESSURE: 131 MMHG | BODY MASS INDEX: 31.39 KG/M2 | WEIGHT: 200 LBS | DIASTOLIC BLOOD PRESSURE: 82 MMHG | HEIGHT: 67 IN | HEART RATE: 71 BPM

## 2023-12-22 DIAGNOSIS — M54.12 CERVICAL RADICULITIS: Primary | ICD-10-CM

## 2023-12-22 DIAGNOSIS — M50.30 DDD (DEGENERATIVE DISC DISEASE), CERVICAL: ICD-10-CM

## 2023-12-22 DIAGNOSIS — M54.2 CERVICALGIA: ICD-10-CM

## 2023-12-22 PROCEDURE — 99213 PR OFFICE/OUTPT VISIT, EST, LEVL III, 20-29 MIN: ICD-10-PCS | Mod: ,,, | Performed by: ANESTHESIOLOGY

## 2023-12-22 PROCEDURE — 99213 OFFICE O/P EST LOW 20 MIN: CPT | Mod: ,,, | Performed by: ANESTHESIOLOGY

## 2023-12-22 NOTE — PROGRESS NOTES
"Subjective:      Patient ID: Justin Del Cid is a 73 y.o. male.    Chief Complaint: Follow-up (Follow-up neck pain. Patient states he feels good injection helped very much. He is able to move around well and doing exercises to help get it better. Pain scale 0)    Referred by: Mayra Wilburn He*     Follow-up  :  This is a pleasant 73-year-old  male  who presents as a follow-up for pain associated with disorder of intervertebral disc at C4-C5, C5-C6 and C6-C7 after completing a cervical interlaminar steroid injection to C7-T1 on 10/11/2023.  Patient has ongoing notable pain relief since receiving this injection.  His pain score today is 1/10.compared to last office visit his pain was 7-8/10.  Patient was thrilled as he has received notable pain relief to his neck and bilateral trapezius region.  This has allowed him to enjoy being around his wife, family. He  has been able to fish and do activities with his wife as well as cutting the grass with a self-propelled lawn more.  He has also enjoyed restorative sleep and reduction with depression that he did not know he had until his pain was relieved.  He no longer takes Aleve and only takes 2 Tylenol a day if needed.  Additionally turning his neck side-to-side while driving does not cause pain.  Overall he is very well pleased.       No history of prior spinal surgery to his neck, no implanted pacemaker, defibrillator or spinal cord stimulator.  He does have pertinent past medical history of a prior myocardial infarction and was placed on 81 mg of baby aspirin as a secondary prevention her his cardiologist Dr. Lehman at [facility]    Vital signs:   Vitals:    12/22/23 1039 12/22/23 1041   BP: 131/82    Pulse: 71    Weight: 90.7 kg (200 lb)    Height: 5' 7" (1.702 m)    PainSc:  0-No pain     Body mass index is 31.32 kg/m².  Pain Disability Index (PDI): 0       Interventional Pain History  10/11/2023:  Cervical interlaminar steroid injection " C7-T1    ROS:  Neck pain + bilateral trapezius.     MRI cervical spine 2023:   Please see Dr. Alessandro Magana's referrral summary for details             Objective:          Physical Exam  General: Well developed; normal weight. ; A&O x 3; No anxiety/depression; NAD  Mental Status: Oriented to person, palce and time. Displays appropriate mood & affect.  Head: Norm cephalic and atraumatic  Neck:  + bilateral cervical paraspinal banding. Full ROM neck turning to the left  Full range of motion with lateral turning to right and with cervical flexion extension.able to raise both arms over head without pain   Eyes: normal conjunctiva, normal lids, normal pupils  ENT and mouth: normal external ear, nose, and no lesions noted on the lips.  Respiratory: Symmetrical, Unlabored. No dyspnea  CV: normal rhythm and rate. No peripheral edema.   Abdomen: Non-distended     Extremities:  Gen: No cyanosis or tenderness to palpation bilateral upper and lower extremities  Skin: Warm, pink, dry, no rashes, no lesions on the lumbar spine  Strength: 5/5 motor strength bilateral upper and lower extremities  ROM: Full ROM in bilateral knees  without pain or instability.     Neuro:  Gait: no altalgic lean, normal toe and heel raise. Independent ambulator.  DTR's: 2+ in bilateral patellar, and ankle  Sensory: Intact to light touch bilateral  upper and lower extremities     Spine: Normal lordosis. No scoliosis  L-spine ROM: Full ROM to flexion, extension, bilateral rotation,   Straight Leg Raise Neg bilaterally  SI Joint: No tenderness to palpation bilaterally.         Assessment:       Encounter Diagnoses   Name Primary?    Cervical radiculitis Yes    DDD (degenerative disc disease), cervical     Cervicalgia          Plan:       Justin was seen today for follow-up.    Diagnoses and all orders for this visit:    Cervical radiculitis    DDD (degenerative disc disease), cervical    Cervicalgia    He is still getting excellent pain relief from his  cervical epidural steroid injection a couple of months ago.  I will continue to monitor him and plan to follow up again in 6 months or sooner if needed.            Past Medical History:   Diagnosis Date    Anxiety     COPD (chronic obstructive pulmonary disease)     Coronary artery disease     GERD (gastroesophageal reflux disease)     Hyperlipidemia     Hypertension     Obsessive-compulsive disorder     Prostate enlargement     Sinusitis        Past Surgical History:   Procedure Laterality Date    ABDOMINAL SURGERY      CARDIAC CATHETERIZATION      CATARACT EXTRACTION W/  INTRAOCULAR LENS IMPLANT Bilateral     COLONOSCOPY      EPIDURAL STEROID INJECTION INTO CERVICAL SPINE N/A 10/11/2023    Procedure: Injection-steroid-epidural-cervical;  Surgeon: Nicky Blair MD;  Location: [facility];  Service: Pain Management;  Laterality: N/A;  C7 -T1    HERNIA REPAIR         Family History   Problem Relation Age of Onset    No Known Problems Mother     Cancer Father     COPD Sister        Social History     Socioeconomic History    Marital status:    Tobacco Use    Smoking status: Every Day     Current packs/day: 1.00     Average packs/day: 1 pack/day for 49.0 years (49.0 ttl pk-yrs)     Types: Cigarettes     Start date: 1975    Smokeless tobacco: Never   Substance and Sexual Activity    Alcohol use: Not Currently    Drug use: Never    Sexual activity: Yes       Current Outpatient Medications   Medication Sig Dispense Refill    albuterol (PROVENTIL) 2.5 mg /3 mL (0.083 %) nebulizer solution Take 2.5 mg by nebulization every 6 (six) hours as needed for Wheezing. Rescue      amLODIPine (NORVASC) 10 MG tablet Take 10 mg by mouth once daily.      amlodipine-benazepril 10-20mg (LOTREL) 10-20 mg per capsule Take 1 capsule by mouth once daily.      aspirin 81 MG Chew Take 81 mg by mouth once daily.      cetirizine (ZYRTEC) 10 MG tablet Take 10 mg by mouth once daily.      EScitalopram oxalate (LEXAPRO) 20 MG tablet Take 20  mg by mouth as needed.      fluticasone-salmeterol diskus inhaler 100-50 mcg Inhale 1 puff into the lungs 2 (two) times daily as needed. Controller      losartan (COZAAR) 100 MG tablet Take 100 mg by mouth once daily.      metoprolol succinate (TOPROL-XL) 25 MG 24 hr tablet Take 25 mg by mouth once daily.      naproxen sodium (ANAPROX) 220 MG tablet Take 220 mg by mouth 2 (two) times daily with meals.      omeprazole (PRILOSEC) 20 MG capsule 40 mg.      omeprazole (PRILOSEC) 40 MG capsule Take 40 mg by mouth once daily.      rosuvastatin (CRESTOR) 5 MG tablet Take 5 mg by mouth every evening.      sildenafiL (VIAGRA) 100 MG tablet 50 mg.      tamsulosin (FLOMAX) 0.4 mg Cap Take 0.4 mg by mouth nightly.       No current facility-administered medications for this visit.       Review of patient's allergies indicates:   Allergen Reactions    Iodine     Shrimp Rash

## 2024-02-27 ENCOUNTER — OFFICE VISIT (OUTPATIENT)
Dept: PAIN MEDICINE | Facility: CLINIC | Age: 74
End: 2024-02-27
Payer: OTHER GOVERNMENT

## 2024-02-27 VITALS
HEIGHT: 67 IN | BODY MASS INDEX: 31.39 KG/M2 | DIASTOLIC BLOOD PRESSURE: 83 MMHG | HEART RATE: 95 BPM | SYSTOLIC BLOOD PRESSURE: 138 MMHG | WEIGHT: 200 LBS

## 2024-02-27 DIAGNOSIS — M47.812 FACET HYPERTROPHY OF CERVICAL REGION: ICD-10-CM

## 2024-02-27 DIAGNOSIS — M50.30 DDD (DEGENERATIVE DISC DISEASE), CERVICAL: Primary | ICD-10-CM

## 2024-02-27 DIAGNOSIS — M54.12 CERVICAL RADICULITIS: ICD-10-CM

## 2024-02-27 PROCEDURE — 99214 OFFICE O/P EST MOD 30 MIN: CPT | Mod: ,,, | Performed by: NURSE PRACTITIONER

## 2024-02-27 RX ORDER — LEVOFLOXACIN 500 MG/1
500 TABLET, FILM COATED ORAL
COMMUNITY
Start: 2024-02-26 | End: 2024-03-21 | Stop reason: CLARIF

## 2024-02-27 RX ORDER — PREDNISONE 20 MG/1
TABLET ORAL
COMMUNITY
Start: 2024-02-26 | End: 2024-03-21 | Stop reason: CLARIF

## 2024-02-27 RX ORDER — TRIAMCINOLONE ACETONIDE 1 MG/G
CREAM TOPICAL
COMMUNITY
Start: 2023-11-26

## 2024-02-27 NOTE — PROGRESS NOTES
Subjective:      Patient ID: Justin Del Cid is a 73 y.o. male.    Chief Complaint: Cervical Spine Pain (C-spine) ((VA) neck pain, wanting to discuss RFA, C/O pain level 8, Taking tylenol and aleve for pain.states when pain returned noticed he has diziness.)    Referred by: Mayra Wilburn He*     HPI:  This is a pleasant 73-year-old male  presenting as a follow-up for neck pain associated with cervical facet arthropathy in his interested in discussing longer term solutions to treat this pain (diagnostic medial branch blocks along with radiofrequency ablation).  Patient confirms he does not have any implanted pacemaker, defibrillator or spinal cord stimulator.  Patient has ongoing pain to the base of his skull and posterior neck that is more pronounced when he is moving his head up and down or laterally turning it with prolonged driving that exacerbates his pain.  Additionally this is waking him up at night in his limiting his ability to fish without pain.  There is no radiation to his scapular or his arms everything is isolated to his cervical axial spine.  He rates his pain on average 8-10/10 and with nonsteroidal anti-inflammatory medications this may reduce down to 7-8/10.  This pain will also wake him up at night and overall limits his quality of life.  He would like to address arthritic pain with longer solutions like the diagnostic nerve blocks and ultimately radiofrequency ablation.    Review of patient's cervical MRI completed in 2023 shows a combination of both cervical degenerative disc disease along with foraminal narrowing and cervical facet arthropathy.  The facet arthropathy is pronounced on the right at C4-5 and pronounced on the left at C5-C6.    Patient was last seen in the clinic in October of 2023 as a follow-up for cervical degenerative disc disease with radiculitis after receiving an interlaminar epidural steroid injection to C7-T1 on 10/11/2023.  During the office visit he has  "ongoing notable pain relief to his neck and bilateral trapezius region which was allowing him to spend more time with his wife and family additionally he was able to fish and do activities with his wife as well as cutting the grass with self-propelled lawn more and enjoying restorative sleep along with a reduction in depression with the pain.          Vital signs:   Vitals:    02/27/24 1041 02/27/24 1042   BP: 138/83    Pulse: 95    Weight: 90.7 kg (200 lb)    Height: 5' 7" (1.702 m)    PainSc:    8     Body mass index is 31.32 kg/m².  Pain Disability Index (PDI): 53       Interventional Pain History  11/2023: KARLY C7-T1    ROS neck pain    Cervical MRI 2023:   FINDINGS:  Normal cervical curvature appears maintained.  Vertebral body height, signal and alignment appear preserved.  No Chiari malformation seen.  Motion artifact present.      C2-3: Neural foramina and spinal canal appear patent.      C3-4: Small dorsal disc protrusion seen extending 0.1 cm posterior to its normal location.      C4-5: Small dorsal and ventral osteophyte complexes are seen with right uncinate process hypertrophy and right posterior facet hypertrophy.  Moderate neural foraminal narrowing appears to be present.      C5-6:  Dorsal and ventral disc osteophyte complexes seen with left posterior facet hypertrophy neural foramen narrowing may be present particularly on the right.    C6-7:  A broad-based left posterior paracentral disc protrusion is seen extended 0.2 cm posterior to its normal location mildly effacing the left ventral thecal sac.      Impression:  Mild degenerative changes cervical spine as above with suspicion of neural foramen narrowing at C4-5 and C5-6 although motion artifact decreases diagnostic sensitivity in that regard.  No spinal canal stenosis or cord compression is identified.  Findings may have slightly progressed at C3-4 in the interval.            Objective:          Physical Exam  General: Well developed; normal " weight. ; A&O x 3; No anxiety/depression; NAD  Mental Status: Oriented to person, palce and time. Displays appropriate mood & affect.  Head: Norm cephalic and atraumatic  Neck:  + bilateral cervical paraspinal banding. limited and painful Full ROM neck turning to the left  and right.  Full range of motion with lateral turning to right and with cervical flexion extension.able to raise both arms over head without pain     Eyes: normal conjunctiva, normal lids, normal pupils  ENT and mouth: normal external ear, nose, and no lesions noted on the lips.  Respiratory: Symmetrical, Unlabored. No dyspnea  CV: normal rhythm and rate. No peripheral edema.   Abdomen: Non-distended     Extremities:  Gen: No cyanosis or tenderness to palpation bilateral upper and lower extremities  Skin: Warm, pink, dry, no rashes, no lesions on the lumbar spine  Strength: 5/5 motor strength bilateral upper and lower extremities  ROM: Full ROM in bilateral knees  without pain or instability.     Neuro:  Gait: no altalgic lean, normal toe and heel raise. Independent ambulator.  DTR's: 2+ in bilateral patellar, and ankle  Sensory: Intact to light touch bilateral  upper and lower extremities     Spine: Normal lordosis. No scoliosis  L-spine ROM: Full ROM to flexion, extension, bilateral rotation,   Straight Leg Raise Neg bilaterally  SI Joint: No tenderness to palpation bilaterally.      Assessment:    This is a pleasant 73-year-old male  presenting as a follow-up for neck pain associated with cervical facet arthropathy in his interested in discussing longer term solutions to treat this pain (diagnostic medial branch blocks along with radiofrequency ablation).  Patient confirms he does not have any implanted pacemaker, defibrillator or spinal cord stimulator.  Patient has ongoing pain to the base of his skull and posterior neck that is more pronounced when he is moving his head up and down or laterally turning it with prolonged driving that  exacerbates his pain.  Additionally this is waking him up at night in his limiting his ability to fish without pain.  There is no radiation to his scapular or his arms everything is isolated to his cervical axial spine.  He rates his pain on average 8-10/10 and with nonsteroidal anti-inflammatory medications this may reduce down to 7-8/10.  This pain will also wake him up at night and overall limits his quality of life.  He would like to address arthritic pain with longer solutions like the diagnostic nerve blocks and ultimately radiofrequency ablation.    Review of patient's cervical MRI completed in 2023 shows a combination of both cervical degenerative disc disease along with foraminal narrowing and cervical facet arthropathy.  The facet arthropathy is pronounced on the right at C4-5 and pronounced on the left at C5-C6.        PLAN OF CARE:  Request sent for MBB bilateral C4-6  Stop ASA 81 and all NSAIDS 7 days before procedure.   No pacemaker/defibrillator or SCS  Follow-up postop.      Encounter Diagnoses   Name Primary?    DDD (degenerative disc disease), cervical Yes    Facet hypertrophy of cervical region     Cervical radiculitis          Plan:           Justin was seen today for cervical spine pain (c-spine).    Diagnoses and all orders for this visit:    DDD (degenerative disc disease), cervical    Facet hypertrophy of cervical region    Cervical radiculitis               Past Medical History:   Diagnosis Date    Anxiety     COPD (chronic obstructive pulmonary disease)     Coronary artery disease     GERD (gastroesophageal reflux disease)     Hyperlipidemia     Hypertension     Obsessive-compulsive disorder     Prostate enlargement     Sinusitis        Past Surgical History:   Procedure Laterality Date    ABDOMINAL SURGERY      CARDIAC CATHETERIZATION      CATARACT EXTRACTION W/  INTRAOCULAR LENS IMPLANT Bilateral     COLONOSCOPY      EPIDURAL STEROID INJECTION INTO CERVICAL SPINE N/A 10/11/2023     Procedure: Injection-steroid-epidural-cervical;  Surgeon: Nicky Blair MD;  Location: [facility] OR;  Service: Pain Management;  Laterality: N/A;  C7 -T1    HERNIA REPAIR         Family History   Problem Relation Age of Onset    No Known Problems Mother     Cancer Father     COPD Sister        Social History     Socioeconomic History    Marital status:    Tobacco Use    Smoking status: Every Day     Current packs/day: 1.00     Average packs/day: 1 pack/day for 49.2 years (49.2 ttl pk-yrs)     Types: Cigarettes     Start date: 1975    Smokeless tobacco: Never   Substance and Sexual Activity    Alcohol use: Not Currently    Drug use: Never    Sexual activity: Yes       Current Outpatient Medications   Medication Sig Dispense Refill    albuterol (PROVENTIL) 2.5 mg /3 mL (0.083 %) nebulizer solution Take 2.5 mg by nebulization every 6 (six) hours as needed for Wheezing. Rescue      amLODIPine (NORVASC) 10 MG tablet Take 10 mg by mouth once daily.      amlodipine-benazepril 10-20mg (LOTREL) 10-20 mg per capsule Take 1 capsule by mouth once daily.      aspirin 81 MG Chew Take 81 mg by mouth once daily.      cetirizine (ZYRTEC) 10 MG tablet Take 10 mg by mouth once daily.      EScitalopram oxalate (LEXAPRO) 20 MG tablet Take 20 mg by mouth as needed.      fluticasone-salmeterol diskus inhaler 100-50 mcg Inhale 1 puff into the lungs 2 (two) times daily as needed. Controller      levoFLOXacin (LEVAQUIN) 500 MG tablet Take 500 mg by mouth.      losartan (COZAAR) 100 MG tablet Take 100 mg by mouth once daily.      metoprolol succinate (TOPROL-XL) 25 MG 24 hr tablet Take 25 mg by mouth once daily.      naproxen sodium (ANAPROX) 220 MG tablet Take 220 mg by mouth 2 (two) times daily with meals.      omeprazole (PRILOSEC) 20 MG capsule 40 mg.      omeprazole (PRILOSEC) 40 MG capsule Take 40 mg by mouth once daily.      predniSONE (DELTASONE) 20 MG tablet Take by mouth.      rosuvastatin (CRESTOR) 5 MG tablet Take 5 mg by  mouth every evening.      sildenafiL (VIAGRA) 100 MG tablet 50 mg.      tamsulosin (FLOMAX) 0.4 mg Cap Take 0.4 mg by mouth nightly.      triamcinolone acetonide 0.1% (KENALOG) 0.1 % cream APPLY SMALL AMOUNT TO THE SKIN TWICE A DAY AS DIRECTED TO NECK FOR 2 WEEKS       No current facility-administered medications for this visit.       Review of patient's allergies indicates:   Allergen Reactions    Iodine     Shrimp Rash

## 2024-02-27 NOTE — PATIENT INSTRUCTIONS
I have ordered a medial branch block (MBB) to joint(s) in your spinal vertebra that contain peripheral nerves.  This MBB is a temporary anesthetic medication designed to block the burning/sharp pain caused from irritation to a spinal nerve root.   This anesthetic block will be injected in the joint where the nerve root is being irritated.   The anticipated pain relief from this MBB is temporary and designed to last anywhere from 1/2 a day to  a week.     Following your discharge, I would like you to test this block out by performing normal house hold chores or activities of daily living for a week. This should be tried after sedation from procedure wears off. Pay attention if you have reduced pain and which activities were improved.   Insurance will typically expect an 80% or greater improvement with pain to approve a burning of the nerve, or Radiofrequency Ablation (RFA) to the same level.     See teaching sheet for RFA

## 2024-03-25 ENCOUNTER — OFFICE VISIT (OUTPATIENT)
Dept: PAIN MEDICINE | Facility: CLINIC | Age: 74
End: 2024-03-25
Payer: OTHER GOVERNMENT

## 2024-03-25 VITALS
HEART RATE: 72 BPM | SYSTOLIC BLOOD PRESSURE: 152 MMHG | BODY MASS INDEX: 29.82 KG/M2 | WEIGHT: 190 LBS | TEMPERATURE: 98 F | DIASTOLIC BLOOD PRESSURE: 94 MMHG | HEIGHT: 67 IN

## 2024-03-25 DIAGNOSIS — M54.12 CERVICAL RADICULITIS: ICD-10-CM

## 2024-03-25 DIAGNOSIS — M47.812 FACET HYPERTROPHY OF CERVICAL REGION: Primary | ICD-10-CM

## 2024-03-25 DIAGNOSIS — M50.30 DDD (DEGENERATIVE DISC DISEASE), CERVICAL: ICD-10-CM

## 2024-03-25 PROCEDURE — 99213 OFFICE O/P EST LOW 20 MIN: CPT | Mod: ,,, | Performed by: NURSE PRACTITIONER

## 2024-03-25 NOTE — H&P (VIEW-ONLY)
ADMISSION HISTORY & PHYSICAL    SUBJECTIVE:    CHIEF COMPLAINT: Neck Pain (Pre op Harvey MBB 4/1/24. C/O pain level ) and Back Pain (Pre-op HARVEY MBB  pain scale 7)       History of Present Illness: 73 y.o. male presents today for preoperative evaluation for bilateral MBB C4-C6 on 04/01/2024. I reviewed the indications for procedure. The risks and benefits of the proposed and alternative treatments were discussed with the patient. Questions pertinent to the procedure were solicited and answered. No assurances were given. Informed consent was obtained. The patient expressed good understanding and wished to proceed with scheduling the procedure.     Review of Systems:   Constitutional: No fever, weakness, or fatigue.   Ear/Nose/Mouth/Throat: No nasal congestion or sore throat.   Respiratory: No shortness of breath or cough.   Cardiovascular: No chest pain, palpitations, or peripheral edema.   Gastrointestinal: No nausea, vomiting, or abdominal pain.   Genitourinary: No dysuria.  Musculoskeletal:  Patient has ongoing pain to the base of his skull and posterior neck that is more pronounced when he is moving his head up and down or laterally turning it with prolonged driving that exacerbates his pain.  Additionally this is waking him up at night in his limiting his ability to fish without pain.  There is no radiation to his scapular or his arms everything is isolated to his cervical axial spine.  He rates his pain on average 8-10/10 and with nonsteroidal anti-inflammatory medications this may reduce down to 7-8/10.  This pain will also wake him up at night and overall limits his quality of life.  He would like to address arthritic pain with longer solutions like the diagnostic nerve blocks and ultimately radiofrequency ablation.     Past Surgical History:   Procedure Laterality Date    ABDOMINAL SURGERY      CARDIAC CATHETERIZATION      ANGIOPLASTY    CATARACT EXTRACTION W/  INTRAOCULAR LENS IMPLANT Bilateral     COLONOSCOPY       EPIDURAL STEROID INJECTION INTO CERVICAL SPINE N/A 10/11/2023    Procedure: Injection-steroid-epidural-cervical;  Surgeon: Nicky Blair MD;  Location: [facility] OR;  Service: Pain Management;  Laterality: N/A;  C7 -T1    HERNIA REPAIR          Past Medical History:   Diagnosis Date    Anxiety     COPD (chronic obstructive pulmonary disease)     Coronary artery disease 2004    ANGIOPLASTY    GERD (gastroesophageal reflux disease)     Hyperlipidemia     Hypertension     Obesity, unspecified     BMI  31.32    Obsessive-compulsive disorder     Prostate enlargement     Sinusitis         OBJECTIVE:    Vitals:    03/25/24 1051   BP: (!) 152/94   Pulse: 72   Temp: 98 °F (36.7 °C)      Pain Disability Index  Family/Home Responsibilities:: 8  Recreation:: 8  Social Activity:: 8  Occupation:: 8  Sexual Behavior:: 8  Self Care:: 6  Life-Support Activities:: 6  Pain Disability Index (PDI): 52      Physical Exam:   General: Well-developed, well-nourished.  Neuro: Alert and oriented x 3.  Psych: Normal mood and affect.  HEENT: Normocephalic. PERRLA EOM intact. Nose and throat clear.  Lungs: Clear to auscultation and percussion.  Heart: Regular rate and rhythm   Abdomen: Soft non-tender. Bowel sounds positive. No rebound tenderness.  Skin: No rashes or open wounds  Musculoskeletal:Neck:  + bilateral cervical paraspinal banding. limited and painful Full ROM neck turning to the left  and right.  Full range of motion with lateral turning to right and with cervical flexion extension.able to raise both arms over head without pain      ASSESSMENT:  1. DDD (degenerative disc disease), cervical    2. Facet hypertrophy of cervical region    3. Cervical radiculitis       PLAN:  Plan for to proceed with bilateral MBB C4-C6 on 04/01/2024. . The patient has been given preoperative instructions and prescriptions for post-operative medication. Post-operative appointment is scheduled for 2 weeks.  Patient was instructed to hold naproxen and  aspirin 81 mg 7 days prior to procedure and then resume postop.  He verified understanding.

## 2024-03-25 NOTE — PROGRESS NOTES
ADMISSION HISTORY & PHYSICAL    SUBJECTIVE:    CHIEF COMPLAINT: Neck Pain (Pre op Harvey MBB 4/1/24. C/O pain level ) and Back Pain (Pre-op HARVEY MBB  pain scale 7)       History of Present Illness: 73 y.o. male presents today for preoperative evaluation for bilateral MBB C4-C6 on 04/01/2024. I reviewed the indications for procedure. The risks and benefits of the proposed and alternative treatments were discussed with the patient. Questions pertinent to the procedure were solicited and answered. No assurances were given. Informed consent was obtained. The patient expressed good understanding and wished to proceed with scheduling the procedure.     Review of Systems:   Constitutional: No fever, weakness, or fatigue.   Ear/Nose/Mouth/Throat: No nasal congestion or sore throat.   Respiratory: No shortness of breath or cough.   Cardiovascular: No chest pain, palpitations, or peripheral edema.   Gastrointestinal: No nausea, vomiting, or abdominal pain.   Genitourinary: No dysuria.  Musculoskeletal:  Patient has ongoing pain to the base of his skull and posterior neck that is more pronounced when he is moving his head up and down or laterally turning it with prolonged driving that exacerbates his pain.  Additionally this is waking him up at night in his limiting his ability to fish without pain.  There is no radiation to his scapular or his arms everything is isolated to his cervical axial spine.  He rates his pain on average 8-10/10 and with nonsteroidal anti-inflammatory medications this may reduce down to 7-8/10.  This pain will also wake him up at night and overall limits his quality of life.  He would like to address arthritic pain with longer solutions like the diagnostic nerve blocks and ultimately radiofrequency ablation.     Past Surgical History:   Procedure Laterality Date    ABDOMINAL SURGERY      CARDIAC CATHETERIZATION      ANGIOPLASTY    CATARACT EXTRACTION W/  INTRAOCULAR LENS IMPLANT Bilateral     COLONOSCOPY       EPIDURAL STEROID INJECTION INTO CERVICAL SPINE N/A 10/11/2023    Procedure: Injection-steroid-epidural-cervical;  Surgeon: Nicky Blair MD;  Location: [facility] OR;  Service: Pain Management;  Laterality: N/A;  C7 -T1    HERNIA REPAIR          Past Medical History:   Diagnosis Date    Anxiety     COPD (chronic obstructive pulmonary disease)     Coronary artery disease 2004    ANGIOPLASTY    GERD (gastroesophageal reflux disease)     Hyperlipidemia     Hypertension     Obesity, unspecified     BMI  31.32    Obsessive-compulsive disorder     Prostate enlargement     Sinusitis         OBJECTIVE:    Vitals:    03/25/24 1051   BP: (!) 152/94   Pulse: 72   Temp: 98 °F (36.7 °C)      Pain Disability Index  Family/Home Responsibilities:: 8  Recreation:: 8  Social Activity:: 8  Occupation:: 8  Sexual Behavior:: 8  Self Care:: 6  Life-Support Activities:: 6  Pain Disability Index (PDI): 52      Physical Exam:   General: Well-developed, well-nourished.  Neuro: Alert and oriented x 3.  Psych: Normal mood and affect.  HEENT: Normocephalic. PERRLA EOM intact. Nose and throat clear.  Lungs: Clear to auscultation and percussion.  Heart: Regular rate and rhythm   Abdomen: Soft non-tender. Bowel sounds positive. No rebound tenderness.  Skin: No rashes or open wounds  Musculoskeletal:Neck:  + bilateral cervical paraspinal banding. limited and painful Full ROM neck turning to the left  and right.  Full range of motion with lateral turning to right and with cervical flexion extension.able to raise both arms over head without pain      ASSESSMENT:  1. DDD (degenerative disc disease), cervical    2. Facet hypertrophy of cervical region    3. Cervical radiculitis       PLAN:  Plan for to proceed with bilateral MBB C4-C6 on 04/01/2024. . The patient has been given preoperative instructions and prescriptions for post-operative medication. Post-operative appointment is scheduled for 2 weeks.  Patient was instructed to hold naproxen and  aspirin 81 mg 7 days prior to procedure and then resume postop.  He verified understanding.

## 2024-03-31 ENCOUNTER — ANESTHESIA EVENT (OUTPATIENT)
Dept: SURGERY | Facility: HOSPITAL | Age: 74
End: 2024-03-31
Payer: OTHER GOVERNMENT

## 2024-03-31 RX ORDER — ACETAMINOPHEN 325 MG/1
650 TABLET ORAL EVERY 4 HOURS PRN
Status: CANCELLED | OUTPATIENT
Start: 2024-03-31

## 2024-03-31 RX ORDER — SODIUM CHLORIDE 9 MG/ML
INJECTION, SOLUTION INTRAVENOUS CONTINUOUS
Status: CANCELLED | OUTPATIENT
Start: 2024-03-31

## 2024-03-31 RX ORDER — SODIUM CHLORIDE, SODIUM LACTATE, POTASSIUM CHLORIDE, CALCIUM CHLORIDE 600; 310; 30; 20 MG/100ML; MG/100ML; MG/100ML; MG/100ML
INJECTION, SOLUTION INTRAVENOUS CONTINUOUS
Status: CANCELLED | OUTPATIENT
Start: 2024-03-31

## 2024-03-31 RX ORDER — SODIUM CHLORIDE, SODIUM GLUCONATE, SODIUM ACETATE, POTASSIUM CHLORIDE AND MAGNESIUM CHLORIDE 30; 37; 368; 526; 502 MG/100ML; MG/100ML; MG/100ML; MG/100ML; MG/100ML
INJECTION, SOLUTION INTRAVENOUS CONTINUOUS
Status: CANCELLED | OUTPATIENT
Start: 2024-03-31 | End: 2024-04-30

## 2024-03-31 RX ORDER — HYDROMORPHONE HYDROCHLORIDE 2 MG/ML
0.4 INJECTION, SOLUTION INTRAMUSCULAR; INTRAVENOUS; SUBCUTANEOUS EVERY 5 MIN PRN
Status: CANCELLED | OUTPATIENT
Start: 2024-03-31

## 2024-03-31 RX ORDER — LIDOCAINE HYDROCHLORIDE 10 MG/ML
1 INJECTION, SOLUTION EPIDURAL; INFILTRATION; INTRACAUDAL; PERINEURAL ONCE AS NEEDED
Status: CANCELLED | OUTPATIENT
Start: 2024-03-31 | End: 2035-08-27

## 2024-03-31 RX ORDER — HYDROCODONE BITARTRATE AND ACETAMINOPHEN 5; 325 MG/1; MG/1
1 TABLET ORAL EVERY 4 HOURS PRN
Status: CANCELLED | OUTPATIENT
Start: 2024-03-31

## 2024-03-31 RX ORDER — DIPHENHYDRAMINE HYDROCHLORIDE 50 MG/ML
25 INJECTION INTRAMUSCULAR; INTRAVENOUS ONCE
Status: CANCELLED | OUTPATIENT
Start: 2024-03-31 | End: 2024-03-31

## 2024-03-31 RX ORDER — ONDANSETRON HYDROCHLORIDE 2 MG/ML
4 INJECTION, SOLUTION INTRAVENOUS DAILY PRN
Status: CANCELLED | OUTPATIENT
Start: 2024-03-31

## 2024-03-31 RX ORDER — MIDAZOLAM HYDROCHLORIDE 2 MG/2ML
2 INJECTION, SOLUTION INTRAMUSCULAR; INTRAVENOUS ONCE AS NEEDED
Status: CANCELLED | OUTPATIENT
Start: 2024-03-31 | End: 2035-08-28

## 2024-03-31 RX ORDER — LIDOCAINE HYDROCHLORIDE 10 MG/ML
1 INJECTION, SOLUTION EPIDURAL; INFILTRATION; INTRACAUDAL; PERINEURAL ONCE
Status: CANCELLED | OUTPATIENT
Start: 2024-03-31 | End: 2024-03-31

## 2024-03-31 RX ORDER — ONDANSETRON 4 MG/1
4 TABLET, ORALLY DISINTEGRATING ORAL ONCE
Status: CANCELLED | OUTPATIENT
Start: 2024-03-31 | End: 2024-03-31

## 2024-03-31 RX ORDER — MEPERIDINE HYDROCHLORIDE 25 MG/ML
12.5 INJECTION INTRAMUSCULAR; INTRAVENOUS; SUBCUTANEOUS ONCE
Status: CANCELLED | OUTPATIENT
Start: 2024-03-31 | End: 2024-03-31

## 2024-04-01 ENCOUNTER — ANESTHESIA (OUTPATIENT)
Dept: SURGERY | Facility: HOSPITAL | Age: 74
End: 2024-04-01
Payer: OTHER GOVERNMENT

## 2024-04-01 ENCOUNTER — HOSPITAL ENCOUNTER (OUTPATIENT)
Facility: HOSPITAL | Age: 74
Discharge: HOME | End: 2024-04-01
Attending: ANESTHESIOLOGY | Admitting: ANESTHESIOLOGY
Payer: OTHER GOVERNMENT

## 2024-04-01 DIAGNOSIS — G89.29 CHRONIC NECK PAIN: ICD-10-CM

## 2024-04-01 DIAGNOSIS — M54.2 CHRONIC NECK PAIN: ICD-10-CM

## 2024-04-01 PROCEDURE — 63600175 PHARM REV CODE 636 W HCPCS: Performed by: NURSE ANESTHETIST, CERTIFIED REGISTERED

## 2024-04-01 PROCEDURE — 37000008 HC ANESTHESIA 1ST 15 MINUTES: Performed by: ANESTHESIOLOGY

## 2024-04-01 PROCEDURE — 25000003 PHARM REV CODE 250: Performed by: ANESTHESIOLOGY

## 2024-04-01 PROCEDURE — 63600175 PHARM REV CODE 636 W HCPCS: Mod: JZ,JG | Performed by: ANESTHESIOLOGY

## 2024-04-01 PROCEDURE — D9220A PRA ANESTHESIA: Mod: ,,, | Performed by: NURSE ANESTHETIST, CERTIFIED REGISTERED

## 2024-04-01 PROCEDURE — 64490 INJ PARAVERT F JNT C/T 1 LEV: CPT | Mod: 50 | Performed by: ANESTHESIOLOGY

## 2024-04-01 PROCEDURE — 64491 INJ PARAVERT F JNT C/T 2 LEV: CPT | Mod: 50 | Performed by: ANESTHESIOLOGY

## 2024-04-01 PROCEDURE — 64491 INJ PARAVERT F JNT C/T 2 LEV: CPT | Mod: 50,KX,, | Performed by: ANESTHESIOLOGY

## 2024-04-01 PROCEDURE — 25000003 PHARM REV CODE 250: Performed by: NURSE ANESTHETIST, CERTIFIED REGISTERED

## 2024-04-01 PROCEDURE — 64490 INJ PARAVERT F JNT C/T 1 LEV: CPT | Mod: 50,KX,, | Performed by: ANESTHESIOLOGY

## 2024-04-01 RX ORDER — LIDOCAINE HYDROCHLORIDE 10 MG/ML
INJECTION, SOLUTION EPIDURAL; INFILTRATION; INTRACAUDAL; PERINEURAL
Status: DISCONTINUED
Start: 2024-04-01 | End: 2024-04-01 | Stop reason: HOSPADM

## 2024-04-01 RX ORDER — LIDOCAINE HYDROCHLORIDE 10 MG/ML
INJECTION, SOLUTION EPIDURAL; INFILTRATION; INTRACAUDAL; PERINEURAL
Status: DISCONTINUED | OUTPATIENT
Start: 2024-04-01 | End: 2024-04-01

## 2024-04-01 RX ORDER — LIDOCAINE HYDROCHLORIDE 10 MG/ML
INJECTION, SOLUTION EPIDURAL; INFILTRATION; INTRACAUDAL; PERINEURAL
Status: DISCONTINUED | OUTPATIENT
Start: 2024-04-01 | End: 2024-04-01 | Stop reason: HOSPADM

## 2024-04-01 RX ORDER — BUPIVACAINE HYDROCHLORIDE 2.5 MG/ML
INJECTION, SOLUTION EPIDURAL; INFILTRATION; INTRACAUDAL
Status: DISCONTINUED | OUTPATIENT
Start: 2024-04-01 | End: 2024-04-01 | Stop reason: HOSPADM

## 2024-04-01 RX ORDER — PROPOFOL 10 MG/ML
VIAL (ML) INTRAVENOUS
Status: DISCONTINUED | OUTPATIENT
Start: 2024-04-01 | End: 2024-04-01

## 2024-04-01 RX ADMIN — LIDOCAINE HYDROCHLORIDE 50 MG: 10 INJECTION, SOLUTION EPIDURAL; INFILTRATION; INTRACAUDAL; PERINEURAL at 09:04

## 2024-04-01 RX ADMIN — SODIUM CHLORIDE, POTASSIUM CHLORIDE, SODIUM LACTATE AND CALCIUM CHLORIDE: 600; 310; 30; 20 INJECTION, SOLUTION INTRAVENOUS at 09:04

## 2024-04-01 RX ADMIN — Medication 50 MG: at 09:04

## 2024-04-01 NOTE — ANESTHESIA POSTPROCEDURE EVALUATION
Anesthesia Post Evaluation    Patient: Justin Del Cid    Procedure(s) Performed: Procedure(s) (LRB):  BLOCK, NERVE, FACET JOINT, CERVICAL, MEDIAL BRANCH  Bilateral c4 c6 (Bilateral)    Final Anesthesia Type: MAC      Patient location during evaluation: OPS  Patient participation: Yes- Able to Participate  Level of consciousness: awake and alert  Post-procedure vital signs: reviewed and stable  Pain management: adequate  Airway patency: patent    PONV status at discharge: No PONV  Anesthetic complications: no      Cardiovascular status: blood pressure returned to baseline  Respiratory status: unassisted and room air  Hydration status: euvolemic  Follow-up not needed.              Vitals Value Taken Time   /83 04/01/24 0726   Temp 36.7 °C (98 °F) 04/01/24 0726   Pulse 76 04/01/24 0726   Resp 18 04/01/24 0729   SpO2 96 % 04/01/24 0726         No case tracking events are documented in the log.      Pain/Belkis Score: No data recorded

## 2024-04-01 NOTE — TRANSFER OF CARE
"Anesthesia Transfer of Care Note    Patient: Justin Del Cid    Procedure(s) Performed: Procedure(s) (LRB):  BLOCK, NERVE, FACET JOINT, CERVICAL, MEDIAL BRANCH  Bilateral c4 c6 (Bilateral)    Patient location: OPS    Anesthesia Type: MAC    Transport from OR: Transported from OR on room air with adequate spontaneous ventilation    Post pain: adequate analgesia    Post assessment: no apparent anesthetic complications    Post vital signs: stable    Level of consciousness: awake    Nausea/Vomiting: no nausea/vomiting    Complications: none    Transfer of care protocol was followed      Last vitals: Visit Vitals  BP (!) 149/79   Pulse 76   Temp 36.7 °C (98 °F) (Oral)   Resp 18   Ht 5' 7" (1.702 m)   Wt 88.2 kg (194 lb 7.1 oz)   SpO2 96%   BMI 30.45 kg/m²     "

## 2024-04-01 NOTE — OP NOTE
Bilateral C4-6 diagnostic medial branch blocks    Pre-Procedure Diagnoses:  1. Chronic pain syndrome  2. Cervicalgia  3. Cervical facet arthropathy    Post-Procedure Diagnoses:  1. Chronic pain syndrome  2. Cervicalgia  3. Cervical facet arthropathy    Anesthesia:  Local and MAC    Estimated Blood Loss:  None    Complications:  None    Informed Consent:  The procedure, risks, benefits, and alternatives were discussed with the patient. There were no contraindications to the procedure. The patient expressed understanding and agreed to proceed. Fully informed written consent was obtained.     Description of the Procedure:  The patient was taken to the operating room. IV access was obtained prior to the start of the procedure. The patient was positioned prone on the fluoroscopy table. Continuous hemodynamic monitoring was initiated and continued throughout the duration of the procedure. The skin overlying the cervicothoracic spine was prepped with Chloraprep and draped into a sterile field. Fluoroscopy was used to identify the locations of the left side C4, C5, and C6 medial branch nerves. Skin anesthesia was achieved using 0.5 mL of 1% lidocaine over each injection site. A 25-gauge 3.5-inch Quinke spinal needle was slowly inserted and advanced under intermittent fluoroscopic guidance until it made bony contact at the target sites. Proper needle position was confirmed under AP, oblique, and lateral fluoroscopic views. Negative aspiration for blood or CSF was confirmed. Then 0.5 mL of 0.25% bupivacaine was easily injected at each level. There was no pain on injection. The needles were removed intact and bleeding was nil. The same procedure was then repeated in identical fashion on the right side.  Sterile bandages were applied. The patient was taken to the recovery room for further observation in stable condition. The patient was then discharged home without any complications.

## 2024-04-01 NOTE — ANESTHESIA PREPROCEDURE EVALUATION
03/31/2024  Justin Del Cid is a 73 y.o., male.  Procedure Information    Case: [id number] Date/Time: 04/01/24 0917   Procedure: BLOCK, NERVE, FACET JOINT, CERVICAL, MEDIAL BRANCH  Bilateral c4 c6 (Bilateral) - Bilateral MBB C4-C6   Anesthesia type: Local MAC   Diagnosis:       DDD (degenerative disc disease), cervical [M50.30]       Facet hypertrophy of cervical region [M47.812]       Cervical radiculitis [M54.12]   Pre-op diagnosis:       DDD (degenerative disc disease), cervical [M50.30]       Facet hypertrophy of cervical region [M47.812]       Cervical radiculitis [M54.12]   Location: [facility] VIRTUAL PROCEDURAL ROOM / [facility] OR   Surgeons: Nicky Blair MD       Pre-op Assessment    I have reviewed the Patient Summary Reports.     I have reviewed the Nursing Notes. I have reviewed the NPO Status.   I have reviewed the Medications.     Review of Systems  Anesthesia Hx:  No problems with previous Anesthesia                Hematology/Oncology:  Hematology Normal   Oncology Normal                                   EENT/Dental:  EENT/Dental Normal           Cardiovascular:  Exercise tolerance: good   Hypertension   CAD                Functional Capacity good / => 4 METS                         Pulmonary:   COPD                     Renal/:   Denies Chronic Renal Disease.                Hepatic/GI:     GERD             Musculoskeletal:  Arthritis               Neurological:    Neuromuscular Disease,                                   Endocrine:  Endocrine Normal          Denies Morbid Obesity / BMI > 40  Dermatological:  Skin Normal    Psych:  Psychiatric History                  Physical Exam  General: Alert, Oriented, Well nourished and Cooperative    Airway:  Mallampati: II   Mouth Opening: Normal  TM Distance: Normal  Tongue: Normal  Neck ROM: Normal ROM    Dental:  Intact    Chest/Lungs:  Clear to  auscultation, Normal Respiratory Rate    Heart:  Rate: Normal  Rhythm: Regular Rhythm        Anesthesia Plan  Type of Anesthesia, risks & benefits discussed:    Anesthesia Type: MAC  Intra-op Monitoring Plan: Standard ASA Monitors  Post Op Pain Control Plan: multimodal analgesia  Induction:  IV  Airway Plan: Direct  Informed Consent: Informed consent signed with the Patient and all parties understand the risks and agree with anesthesia plan.  All questions answered. Patient consented to blood products? Yes  ASA Score: 3  Day of Surgery Review of History & Physical: H&P Update referred to the surgeon/provider.I have interviewed and examined the patient. I have reviewed the patient's H&P dated: There are no significant changes.     Ready For Surgery From Anesthesia Perspective.     .

## 2024-04-01 NOTE — DISCHARGE SUMMARY
[facility] Surgical - Periop Services  Discharge Note  Short Stay    Procedure(s) (LRB):  BLOCK, NERVE, FACET JOINT, CERVICAL, MEDIAL BRANCH  Bilateral c4 c6 (Bilateral)      OUTCOME: Patient tolerated treatment/procedure well without complication and is now ready for discharge.    DISPOSITION: Home or Self Care    FINAL DIAGNOSIS:  <principal problem not specified>    FOLLOWUP: In clinic    DISCHARGE INSTRUCTIONS:  No discharge procedures on file.     TIME SPENT ON DISCHARGE: 5 minutes

## 2024-04-02 VITALS
BODY MASS INDEX: 30.52 KG/M2 | WEIGHT: 194.44 LBS | RESPIRATION RATE: 16 BRPM | DIASTOLIC BLOOD PRESSURE: 86 MMHG | SYSTOLIC BLOOD PRESSURE: 147 MMHG | HEART RATE: 70 BPM | TEMPERATURE: 98 F | HEIGHT: 67 IN | OXYGEN SATURATION: 97 %

## 2024-04-08 ENCOUNTER — OFFICE VISIT (OUTPATIENT)
Dept: PAIN MEDICINE | Facility: CLINIC | Age: 74
End: 2024-04-08
Payer: OTHER GOVERNMENT

## 2024-04-08 VITALS
TEMPERATURE: 98 F | SYSTOLIC BLOOD PRESSURE: 143 MMHG | BODY MASS INDEX: 30.52 KG/M2 | DIASTOLIC BLOOD PRESSURE: 87 MMHG | HEIGHT: 67 IN | HEART RATE: 71 BPM | WEIGHT: 194.44 LBS

## 2024-04-08 DIAGNOSIS — M50.30 DDD (DEGENERATIVE DISC DISEASE), CERVICAL: ICD-10-CM

## 2024-04-08 DIAGNOSIS — M54.12 CERVICAL RADICULOPATHY: ICD-10-CM

## 2024-04-08 DIAGNOSIS — M47.812 ARTHROPATHY OF CERVICAL FACET JOINT: Primary | ICD-10-CM

## 2024-04-08 PROCEDURE — 99214 OFFICE O/P EST MOD 30 MIN: CPT | Mod: ,,, | Performed by: NURSE PRACTITIONER

## 2024-04-08 NOTE — PROGRESS NOTES
"Subjective:      Patient ID: Justin Del Cid is a 73 y.o. male.    Chief Complaint: Back Pain (([place]) Post-op bilateral MBB C4-6 4/01/2024, pt states he received great relief, 2nd mbb schedule for 4/17/24 sign consent today, OTC medication for relief, pain level 3/10///)    Referred by: Mayra Wilburn He*     HPI:  This is a pleasant 73-year-old established male  presenting as a postoperative follow-up after completing his 1st set of bilateral diagnostic medial branch block C4-C6 on 04/01/2024 stated he had excellent pain relief to his posterior neck on both sides proximally 2 weeks he was able to have more restorative sleep walking longer distances driving longer distances without notable pain to his neck.  His current pain score today is 3/10.  Would like to proceed with his 2nd diagnostic medial branch block C4-C6.  This has been scheduled for 05/01/2024.  He also wants to schedule his right radiofrequency ablation C4-C6 for longer term neck pain relief.  He denies any spinal cord stimulator, defibrillator or pacemaker.        Vital signs:   Vitals:    04/08/24 1053   BP: (!) 143/87   Pulse: 71   Temp: 98.1 °F (36.7 °C)   TempSrc: Oral   Weight: 88.2 kg (194 lb 7.1 oz)   Height: 5' 7" (1.702 m)   PainSc:   3     Body mass index is 30.45 kg/m².  Pain Disability Index (PDI): 21       Interventional Pain History  04/01/2024:  1st set bilateral MBB C4-C6  10/11/2023: KARLY C7-T1    ROS:  Neck pain +bilateral trapezius    MRI cervical spine 2023:   Please see Dr. Alessandro Magana's referrral summary for details           Objective:          Physical Exam    General: Well developed; normal weight. ; A&O x 3; No anxiety/depression; NAD  Mental Status: Oriented to person, palce and time. Displays appropriate mood & affect.  Head: Norm cephalic and atraumatic  Neck:  + bilateral cervical paraspinal banding. Full ROM neck turning to the left  Full range of motion with lateral turning to right and with cervical " flexion extension.able to raise both arms over head without pain   Eyes: normal conjunctiva, normal lids, normal pupils  ENT and mouth: normal external ear, nose, and no lesions noted on the lips.  Respiratory: Symmetrical, Unlabored. No dyspnea  CV: normal rhythm and rate. No peripheral edema.   Abdomen: Non-distended     Extremities:  Gen: No cyanosis or tenderness to palpation bilateral upper and lower extremities  Skin: Warm, pink, dry, no rashes, no lesions on the lumbar spine  Strength: 5/5 motor strength bilateral upper and lower extremities  ROM: Full ROM in bilateral knees  without pain or instability.     Neuro:  Gait: no altalgic lean, normal toe and heel raise. Independent ambulator.  DTR's: 2+ in bilateral patellar, and ankle  Sensory: Intact to light touch bilateral  upper and lower extremities     Spine: Normal lordosis. No scoliosis  L-spine ROM: Full ROM to flexion, extension, bilateral rotation,   Straight Leg Raise Neg bilaterally  SI Joint: No tenderness to palpation bilaterally.     Assessment:         Plan of care:   Patient is scheduled to receive his 1st radiofrequency ablation C4-C6 to the left side on 05/01/2024 as he received greater than 80% relief of pain to his cervical posterior region after completing 2 separate bilateral diagnostic medial branch blocks to C4-C6.  He would like to proceed with a longer-term pain relief for cervical facet arthropathy thus he would like to proceed with the cervical radiofrequency ablation on his left side as this is his most problematic area of pain.  Following his 1st radiofrequency ablation left C4-C6 patient will follow-up postoperatively to see if he qualifies to proceed with the right radiofrequency ablation in the same areas.    Encounter Diagnoses   Name Primary?    Arthropathy of cervical facet joint Yes    Cervical radiculopathy          Plan:       Justin was seen today for back pain.    Diagnoses and all orders for this visit:    Arthropathy  of cervical facet joint    Cervical radiculopathy               Past Medical History:   Diagnosis Date    Anxiety     COPD (chronic obstructive pulmonary disease)     Coronary artery disease 2004    ANGIOPLASTY    GERD (gastroesophageal reflux disease)     Hyperlipidemia     Hypertension     Neck pain     Obesity, unspecified     BMI  31.32    Obsessive-compulsive disorder     Prostate enlargement     Sinusitis        Past Surgical History:   Procedure Laterality Date    ABDOMINAL SURGERY      CARDIAC CATHETERIZATION      ANGIOPLASTY    CATARACT EXTRACTION W/  INTRAOCULAR LENS IMPLANT Bilateral     COLONOSCOPY      EPIDURAL STEROID INJECTION INTO CERVICAL SPINE N/A 10/11/2023    Procedure: Injection-steroid-epidural-cervical;  Surgeon: Nicky Blair MD;  Location: [facility] OR;  Service: Pain Management;  Laterality: N/A;  C7 -T1    HERNIA REPAIR      INJECTION OF ANESTHETIC AGENT AROUND MEDIAL BRANCH NERVES INNERVATING CERVICAL FACET JOINT Bilateral 4/1/2024    Procedure: BLOCK, NERVE, FACET JOINT, CERVICAL, MEDIAL BRANCH  Bilateral c4 c6;  Surgeon: Nicky Blair MD;  Location: [facility] OR;  Service: Pain Management;  Laterality: Bilateral;  Bilateral MBB C4-C6       Family History   Problem Relation Age of Onset    No Known Problems Mother     Cancer Father     COPD Sister        Social History     Socioeconomic History    Marital status:    Tobacco Use    Smoking status: Every Day     Current packs/day: 1.00     Average packs/day: 1 pack/day for 49.3 years (49.3 ttl pk-yrs)     Types: Cigarettes     Start date: 1975    Smokeless tobacco: Never   Substance and Sexual Activity    Alcohol use: Not Currently    Drug use: Never    Sexual activity: Yes       Current Outpatient Medications   Medication Sig Dispense Refill    albuterol (PROVENTIL) 2.5 mg /3 mL (0.083 %) nebulizer solution Take 2.5 mg by nebulization every 6 (six) hours as needed for Wheezing. Rescue      amLODIPine (NORVASC) 10 MG tablet Take 10 mg  by mouth once daily.      amlodipine-benazepril 10-20mg (LOTREL) 10-20 mg per capsule Take 1 capsule by mouth once daily.      aspirin 81 MG Chew Take 81 mg by mouth once daily.      cetirizine (ZYRTEC) 10 MG tablet Take 10 mg by mouth once daily.      EScitalopram oxalate (LEXAPRO) 20 MG tablet Take 20 mg by mouth as needed.      fluticasone-salmeterol diskus inhaler 100-50 mcg Inhale 1 puff into the lungs 2 (two) times daily as needed. Controller      losartan (COZAAR) 100 MG tablet Take 100 mg by mouth once daily.      metoprolol succinate (TOPROL-XL) 25 MG 24 hr tablet Take 25 mg by mouth once daily.      naproxen sodium (ANAPROX) 220 MG tablet Take 220 mg by mouth 2 (two) times daily with meals.      omeprazole (PRILOSEC) 20 MG capsule 40 mg.      omeprazole (PRILOSEC) 40 MG capsule Take 40 mg by mouth once daily.      rosuvastatin (CRESTOR) 5 MG tablet Take 5 mg by mouth every evening.      sildenafiL (VIAGRA) 100 MG tablet 50 mg.      tamsulosin (FLOMAX) 0.4 mg Cap Take 0.4 mg by mouth nightly.      triamcinolone acetonide 0.1% (KENALOG) 0.1 % cream APPLY SMALL AMOUNT TO THE SKIN TWICE A DAY AS DIRECTED TO NECK FOR 2 WEEKS       No current facility-administered medications for this visit.       Review of patient's allergies indicates:   Allergen Reactions    Iodine     Shrimp Rash

## 2024-04-08 NOTE — H&P (VIEW-ONLY)
"Subjective:      Patient ID: Justin Del Cid is a 73 y.o. male.    Chief Complaint: Back Pain (([place]) Post-op bilateral MBB C4-6 4/01/2024, pt states he received great relief, 2nd mbb schedule for 4/17/24 sign consent today, OTC medication for relief, pain level 3/10///)    Referred by: Mayra Wilburn He*     HPI:  This is a pleasant 73-year-old established male  presenting as a postoperative follow-up after completing his 1st set of bilateral diagnostic medial branch block C4-C6 on 04/01/2024 stated he had excellent pain relief to his posterior neck on both sides proximally 2 weeks he was able to have more restorative sleep walking longer distances driving longer distances without notable pain to his neck.  His current pain score today is 3/10.  Would like to proceed with his 2nd diagnostic medial branch block C4-C6.  This has been scheduled for 05/01/2024.  He also wants to schedule his right radiofrequency ablation C4-C6 for longer term neck pain relief.  He denies any spinal cord stimulator, defibrillator or pacemaker.        Vital signs:   Vitals:    04/08/24 1053   BP: (!) 143/87   Pulse: 71   Temp: 98.1 °F (36.7 °C)   TempSrc: Oral   Weight: 88.2 kg (194 lb 7.1 oz)   Height: 5' 7" (1.702 m)   PainSc:   3     Body mass index is 30.45 kg/m².  Pain Disability Index (PDI): 21       Interventional Pain History  04/01/2024:  1st set bilateral MBB C4-C6  10/11/2023: KARLY C7-T1    ROS:  Neck pain +bilateral trapezius    MRI cervical spine 2023:   Please see Dr. Alessandro Magana's referrral summary for details           Objective:          Physical Exam    General: Well developed; normal weight. ; A&O x 3; No anxiety/depression; NAD  Mental Status: Oriented to person, palce and time. Displays appropriate mood & affect.  Head: Norm cephalic and atraumatic  Neck:  + bilateral cervical paraspinal banding. Full ROM neck turning to the left  Full range of motion with lateral turning to right and with cervical " flexion extension.able to raise both arms over head without pain   Eyes: normal conjunctiva, normal lids, normal pupils  ENT and mouth: normal external ear, nose, and no lesions noted on the lips.  Respiratory: Symmetrical, Unlabored. No dyspnea  CV: normal rhythm and rate. No peripheral edema.   Abdomen: Non-distended     Extremities:  Gen: No cyanosis or tenderness to palpation bilateral upper and lower extremities  Skin: Warm, pink, dry, no rashes, no lesions on the lumbar spine  Strength: 5/5 motor strength bilateral upper and lower extremities  ROM: Full ROM in bilateral knees  without pain or instability.     Neuro:  Gait: no altalgic lean, normal toe and heel raise. Independent ambulator.  DTR's: 2+ in bilateral patellar, and ankle  Sensory: Intact to light touch bilateral  upper and lower extremities     Spine: Normal lordosis. No scoliosis  L-spine ROM: Full ROM to flexion, extension, bilateral rotation,   Straight Leg Raise Neg bilaterally  SI Joint: No tenderness to palpation bilaterally.     Assessment:         Plan of care:   Patient is scheduled to receive his 1st radiofrequency ablation C4-C6 to the left side on 05/01/2024 as he received greater than 80% relief of pain to his cervical posterior region after completing 2 separate bilateral diagnostic medial branch blocks to C4-C6.  He would like to proceed with a longer-term pain relief for cervical facet arthropathy thus he would like to proceed with the cervical radiofrequency ablation on his left side as this is his most problematic area of pain.  Following his 1st radiofrequency ablation left C4-C6 patient will follow-up postoperatively to see if he qualifies to proceed with the right radiofrequency ablation in the same areas.    Encounter Diagnoses   Name Primary?    Arthropathy of cervical facet joint Yes    Cervical radiculopathy          Plan:       Justin was seen today for back pain.    Diagnoses and all orders for this visit:    Arthropathy  of cervical facet joint    Cervical radiculopathy               Past Medical History:   Diagnosis Date    Anxiety     COPD (chronic obstructive pulmonary disease)     Coronary artery disease 2004    ANGIOPLASTY    GERD (gastroesophageal reflux disease)     Hyperlipidemia     Hypertension     Neck pain     Obesity, unspecified     BMI  31.32    Obsessive-compulsive disorder     Prostate enlargement     Sinusitis        Past Surgical History:   Procedure Laterality Date    ABDOMINAL SURGERY      CARDIAC CATHETERIZATION      ANGIOPLASTY    CATARACT EXTRACTION W/  INTRAOCULAR LENS IMPLANT Bilateral     COLONOSCOPY      EPIDURAL STEROID INJECTION INTO CERVICAL SPINE N/A 10/11/2023    Procedure: Injection-steroid-epidural-cervical;  Surgeon: Nicky Blair MD;  Location: [facility] OR;  Service: Pain Management;  Laterality: N/A;  C7 -T1    HERNIA REPAIR      INJECTION OF ANESTHETIC AGENT AROUND MEDIAL BRANCH NERVES INNERVATING CERVICAL FACET JOINT Bilateral 4/1/2024    Procedure: BLOCK, NERVE, FACET JOINT, CERVICAL, MEDIAL BRANCH  Bilateral c4 c6;  Surgeon: Nicky Blair MD;  Location: [facility] OR;  Service: Pain Management;  Laterality: Bilateral;  Bilateral MBB C4-C6       Family History   Problem Relation Age of Onset    No Known Problems Mother     Cancer Father     COPD Sister        Social History     Socioeconomic History    Marital status:    Tobacco Use    Smoking status: Every Day     Current packs/day: 1.00     Average packs/day: 1 pack/day for 49.3 years (49.3 ttl pk-yrs)     Types: Cigarettes     Start date: 1975    Smokeless tobacco: Never   Substance and Sexual Activity    Alcohol use: Not Currently    Drug use: Never    Sexual activity: Yes       Current Outpatient Medications   Medication Sig Dispense Refill    albuterol (PROVENTIL) 2.5 mg /3 mL (0.083 %) nebulizer solution Take 2.5 mg by nebulization every 6 (six) hours as needed for Wheezing. Rescue      amLODIPine (NORVASC) 10 MG tablet Take 10 mg  by mouth once daily.      amlodipine-benazepril 10-20mg (LOTREL) 10-20 mg per capsule Take 1 capsule by mouth once daily.      aspirin 81 MG Chew Take 81 mg by mouth once daily.      cetirizine (ZYRTEC) 10 MG tablet Take 10 mg by mouth once daily.      EScitalopram oxalate (LEXAPRO) 20 MG tablet Take 20 mg by mouth as needed.      fluticasone-salmeterol diskus inhaler 100-50 mcg Inhale 1 puff into the lungs 2 (two) times daily as needed. Controller      losartan (COZAAR) 100 MG tablet Take 100 mg by mouth once daily.      metoprolol succinate (TOPROL-XL) 25 MG 24 hr tablet Take 25 mg by mouth once daily.      naproxen sodium (ANAPROX) 220 MG tablet Take 220 mg by mouth 2 (two) times daily with meals.      omeprazole (PRILOSEC) 20 MG capsule 40 mg.      omeprazole (PRILOSEC) 40 MG capsule Take 40 mg by mouth once daily.      rosuvastatin (CRESTOR) 5 MG tablet Take 5 mg by mouth every evening.      sildenafiL (VIAGRA) 100 MG tablet 50 mg.      tamsulosin (FLOMAX) 0.4 mg Cap Take 0.4 mg by mouth nightly.      triamcinolone acetonide 0.1% (KENALOG) 0.1 % cream APPLY SMALL AMOUNT TO THE SKIN TWICE A DAY AS DIRECTED TO NECK FOR 2 WEEKS       No current facility-administered medications for this visit.       Review of patient's allergies indicates:   Allergen Reactions    Iodine     Shrimp Rash

## 2024-04-08 NOTE — PATIENT INSTRUCTIONS
04- : scheduled for second diagnostic medial branch block to C4-C6  05-: Left RFA C4-C6 (RFA = radiofrequency ablation and burns the nerve to desensitize so you can have better pain control.)    You will be scheduled today for a Right RFA C4-C6 and a post op vist after your left  RFA completion on

## 2024-04-08 NOTE — H&P (VIEW-ONLY)
"Subjective:      Patient ID: Justin Del Cid is a 73 y.o. male.    Chief Complaint: Back Pain (([place]) Post-op bilateral MBB C4-6 4/01/2024, pt states he received great relief, 2nd mbb schedule for 4/17/24 sign consent today, OTC medication for relief, pain level 3/10///)    Referred by: Mayra Wilburn He*     HPI:  This is a pleasant 73-year-old established male  presenting as a postoperative follow-up after completing his 1st set of bilateral diagnostic medial branch block C4-C6 on 04/01/2024 stated he had excellent pain relief to his posterior neck on both sides proximally 2 weeks he was able to have more restorative sleep walking longer distances driving longer distances without notable pain to his neck.  His current pain score today is 3/10.  Would like to proceed with his 2nd diagnostic medial branch block C4-C6.  This has been scheduled for 05/01/2024.  He also wants to schedule his right radiofrequency ablation C4-C6 for longer term neck pain relief.  He denies any spinal cord stimulator, defibrillator or pacemaker.        Vital signs:   Vitals:    04/08/24 1053   BP: (!) 143/87   Pulse: 71   Temp: 98.1 °F (36.7 °C)   TempSrc: Oral   Weight: 88.2 kg (194 lb 7.1 oz)   Height: 5' 7" (1.702 m)   PainSc:   3     Body mass index is 30.45 kg/m².  Pain Disability Index (PDI): 21       Interventional Pain History  04/01/2024:  1st set bilateral MBB C4-C6  10/11/2023: KARLY C7-T1    ROS:  Neck pain +bilateral trapezius    MRI cervical spine 2023:   Please see Dr. Alessandro Magana's referrral summary for details           Objective:          Physical Exam    General: Well developed; normal weight. ; A&O x 3; No anxiety/depression; NAD  Mental Status: Oriented to person, palce and time. Displays appropriate mood & affect.  Head: Norm cephalic and atraumatic  Neck:  + bilateral cervical paraspinal banding. Full ROM neck turning to the left  Full range of motion with lateral turning to right and with cervical " flexion extension.able to raise both arms over head without pain   Eyes: normal conjunctiva, normal lids, normal pupils  ENT and mouth: normal external ear, nose, and no lesions noted on the lips.  Respiratory: Symmetrical, Unlabored. No dyspnea  CV: normal rhythm and rate. No peripheral edema.   Abdomen: Non-distended     Extremities:  Gen: No cyanosis or tenderness to palpation bilateral upper and lower extremities  Skin: Warm, pink, dry, no rashes, no lesions on the lumbar spine  Strength: 5/5 motor strength bilateral upper and lower extremities  ROM: Full ROM in bilateral knees  without pain or instability.     Neuro:  Gait: no altalgic lean, normal toe and heel raise. Independent ambulator.  DTR's: 2+ in bilateral patellar, and ankle  Sensory: Intact to light touch bilateral  upper and lower extremities     Spine: Normal lordosis. No scoliosis  L-spine ROM: Full ROM to flexion, extension, bilateral rotation,   Straight Leg Raise Neg bilaterally  SI Joint: No tenderness to palpation bilaterally.     Assessment:         Plan of care:   Patient is scheduled to receive his 1st radiofrequency ablation C4-C6 to the left side on 05/01/2024 as he received greater than 80% relief of pain to his cervical posterior region after completing 2 separate bilateral diagnostic medial branch blocks to C4-C6.  He would like to proceed with a longer-term pain relief for cervical facet arthropathy thus he would like to proceed with the cervical radiofrequency ablation on his left side as this is his most problematic area of pain.  Following his 1st radiofrequency ablation left C4-C6 patient will follow-up postoperatively to see if he qualifies to proceed with the right radiofrequency ablation in the same areas.    Encounter Diagnoses   Name Primary?    Arthropathy of cervical facet joint Yes    Cervical radiculopathy          Plan:       Justin was seen today for back pain.    Diagnoses and all orders for this visit:    Arthropathy  of cervical facet joint    Cervical radiculopathy               Past Medical History:   Diagnosis Date    Anxiety     COPD (chronic obstructive pulmonary disease)     Coronary artery disease 2004    ANGIOPLASTY    GERD (gastroesophageal reflux disease)     Hyperlipidemia     Hypertension     Neck pain     Obesity, unspecified     BMI  31.32    Obsessive-compulsive disorder     Prostate enlargement     Sinusitis        Past Surgical History:   Procedure Laterality Date    ABDOMINAL SURGERY      CARDIAC CATHETERIZATION      ANGIOPLASTY    CATARACT EXTRACTION W/  INTRAOCULAR LENS IMPLANT Bilateral     COLONOSCOPY      EPIDURAL STEROID INJECTION INTO CERVICAL SPINE N/A 10/11/2023    Procedure: Injection-steroid-epidural-cervical;  Surgeon: Nicky Blair MD;  Location: [facility] OR;  Service: Pain Management;  Laterality: N/A;  C7 -T1    HERNIA REPAIR      INJECTION OF ANESTHETIC AGENT AROUND MEDIAL BRANCH NERVES INNERVATING CERVICAL FACET JOINT Bilateral 4/1/2024    Procedure: BLOCK, NERVE, FACET JOINT, CERVICAL, MEDIAL BRANCH  Bilateral c4 c6;  Surgeon: Nicky Blair MD;  Location: [facility] OR;  Service: Pain Management;  Laterality: Bilateral;  Bilateral MBB C4-C6       Family History   Problem Relation Age of Onset    No Known Problems Mother     Cancer Father     COPD Sister        Social History     Socioeconomic History    Marital status:    Tobacco Use    Smoking status: Every Day     Current packs/day: 1.00     Average packs/day: 1 pack/day for 49.3 years (49.3 ttl pk-yrs)     Types: Cigarettes     Start date: 1975    Smokeless tobacco: Never   Substance and Sexual Activity    Alcohol use: Not Currently    Drug use: Never    Sexual activity: Yes       Current Outpatient Medications   Medication Sig Dispense Refill    albuterol (PROVENTIL) 2.5 mg /3 mL (0.083 %) nebulizer solution Take 2.5 mg by nebulization every 6 (six) hours as needed for Wheezing. Rescue      amLODIPine (NORVASC) 10 MG tablet Take 10 mg  by mouth once daily.      amlodipine-benazepril 10-20mg (LOTREL) 10-20 mg per capsule Take 1 capsule by mouth once daily.      aspirin 81 MG Chew Take 81 mg by mouth once daily.      cetirizine (ZYRTEC) 10 MG tablet Take 10 mg by mouth once daily.      EScitalopram oxalate (LEXAPRO) 20 MG tablet Take 20 mg by mouth as needed.      fluticasone-salmeterol diskus inhaler 100-50 mcg Inhale 1 puff into the lungs 2 (two) times daily as needed. Controller      losartan (COZAAR) 100 MG tablet Take 100 mg by mouth once daily.      metoprolol succinate (TOPROL-XL) 25 MG 24 hr tablet Take 25 mg by mouth once daily.      naproxen sodium (ANAPROX) 220 MG tablet Take 220 mg by mouth 2 (two) times daily with meals.      omeprazole (PRILOSEC) 20 MG capsule 40 mg.      omeprazole (PRILOSEC) 40 MG capsule Take 40 mg by mouth once daily.      rosuvastatin (CRESTOR) 5 MG tablet Take 5 mg by mouth every evening.      sildenafiL (VIAGRA) 100 MG tablet 50 mg.      tamsulosin (FLOMAX) 0.4 mg Cap Take 0.4 mg by mouth nightly.      triamcinolone acetonide 0.1% (KENALOG) 0.1 % cream APPLY SMALL AMOUNT TO THE SKIN TWICE A DAY AS DIRECTED TO NECK FOR 2 WEEKS       No current facility-administered medications for this visit.       Review of patient's allergies indicates:   Allergen Reactions    Iodine     Shrimp Rash

## 2024-04-09 ENCOUNTER — ANESTHESIA EVENT (OUTPATIENT)
Dept: SURGERY | Facility: HOSPITAL | Age: 74
End: 2024-04-09
Payer: OTHER GOVERNMENT

## 2024-04-17 ENCOUNTER — HOSPITAL ENCOUNTER (OUTPATIENT)
Facility: HOSPITAL | Age: 74
Discharge: HOME | End: 2024-04-17
Attending: ANESTHESIOLOGY | Admitting: ANESTHESIOLOGY
Payer: OTHER GOVERNMENT

## 2024-04-17 ENCOUNTER — ANESTHESIA (OUTPATIENT)
Dept: SURGERY | Facility: HOSPITAL | Age: 74
End: 2024-04-17
Payer: OTHER GOVERNMENT

## 2024-04-17 VITALS
SYSTOLIC BLOOD PRESSURE: 121 MMHG | OXYGEN SATURATION: 97 % | RESPIRATION RATE: 20 BRPM | TEMPERATURE: 99 F | HEIGHT: 67 IN | HEART RATE: 81 BPM | BODY MASS INDEX: 30.24 KG/M2 | DIASTOLIC BLOOD PRESSURE: 77 MMHG | WEIGHT: 192.69 LBS

## 2024-04-17 DIAGNOSIS — M54.2 CHRONIC NECK PAIN: ICD-10-CM

## 2024-04-17 DIAGNOSIS — G89.29 CHRONIC NECK PAIN: ICD-10-CM

## 2024-04-17 PROCEDURE — 64490 INJ PARAVERT F JNT C/T 1 LEV: CPT | Mod: 50,,, | Performed by: ANESTHESIOLOGY

## 2024-04-17 PROCEDURE — 25000003 PHARM REV CODE 250

## 2024-04-17 PROCEDURE — D9220A PRA ANESTHESIA: Mod: CRNA,,,

## 2024-04-17 PROCEDURE — 64491 INJ PARAVERT F JNT C/T 2 LEV: CPT | Mod: 50,,, | Performed by: ANESTHESIOLOGY

## 2024-04-17 PROCEDURE — 37000009 HC ANESTHESIA EA ADD 15 MINS: Performed by: ANESTHESIOLOGY

## 2024-04-17 PROCEDURE — 64491 INJ PARAVERT F JNT C/T 2 LEV: CPT | Mod: 50 | Performed by: ANESTHESIOLOGY

## 2024-04-17 PROCEDURE — 25000003 PHARM REV CODE 250: Performed by: ANESTHESIOLOGY

## 2024-04-17 PROCEDURE — 63600175 PHARM REV CODE 636 W HCPCS

## 2024-04-17 PROCEDURE — 37000008 HC ANESTHESIA 1ST 15 MINUTES: Performed by: ANESTHESIOLOGY

## 2024-04-17 PROCEDURE — D9220A PRA ANESTHESIA: Mod: ANES,,, | Performed by: ANESTHESIOLOGY

## 2024-04-17 PROCEDURE — 63600175 PHARM REV CODE 636 W HCPCS: Mod: JZ,JG | Performed by: ANESTHESIOLOGY

## 2024-04-17 PROCEDURE — 64490 INJ PARAVERT F JNT C/T 1 LEV: CPT | Mod: 50 | Performed by: ANESTHESIOLOGY

## 2024-04-17 RX ORDER — PROPOFOL 10 MG/ML
VIAL (ML) INTRAVENOUS
Status: DISCONTINUED | OUTPATIENT
Start: 2024-04-17 | End: 2024-04-17

## 2024-04-17 RX ORDER — ACETAMINOPHEN 500 MG
500 TABLET ORAL EVERY 6 HOURS PRN
COMMUNITY

## 2024-04-17 RX ORDER — LIDOCAINE HYDROCHLORIDE 20 MG/ML
INJECTION INTRAVENOUS
Status: DISCONTINUED | OUTPATIENT
Start: 2024-04-17 | End: 2024-04-17

## 2024-04-17 RX ORDER — BUPIVACAINE HYDROCHLORIDE 2.5 MG/ML
INJECTION, SOLUTION EPIDURAL; INFILTRATION; INTRACAUDAL
Status: DISCONTINUED | OUTPATIENT
Start: 2024-04-17 | End: 2024-04-17 | Stop reason: HOSPADM

## 2024-04-17 RX ORDER — LIDOCAINE HYDROCHLORIDE 10 MG/ML
INJECTION, SOLUTION EPIDURAL; INFILTRATION; INTRACAUDAL; PERINEURAL
Status: DISCONTINUED
Start: 2024-04-17 | End: 2024-04-17 | Stop reason: HOSPADM

## 2024-04-17 RX ORDER — TRIAMCINOLONE ACETONIDE 40 MG/ML
INJECTION, SUSPENSION INTRA-ARTICULAR; INTRAMUSCULAR
Status: DISCONTINUED
Start: 2024-04-17 | End: 2024-04-17 | Stop reason: WASHOUT

## 2024-04-17 RX ORDER — BUPIVACAINE HYDROCHLORIDE 2.5 MG/ML
INJECTION, SOLUTION EPIDURAL; INFILTRATION; INTRACAUDAL
Status: DISCONTINUED
Start: 2024-04-17 | End: 2024-04-17 | Stop reason: HOSPADM

## 2024-04-17 RX ORDER — LIDOCAINE HYDROCHLORIDE 10 MG/ML
INJECTION, SOLUTION EPIDURAL; INFILTRATION; INTRACAUDAL; PERINEURAL
Status: DISCONTINUED | OUTPATIENT
Start: 2024-04-17 | End: 2024-04-17 | Stop reason: HOSPADM

## 2024-04-17 RX ADMIN — LIDOCAINE HYDROCHLORIDE 50 MG: 20 INJECTION INTRAVENOUS at 12:04

## 2024-04-17 RX ADMIN — SODIUM CHLORIDE: 9 INJECTION, SOLUTION INTRAVENOUS at 12:04

## 2024-04-17 RX ADMIN — PROPOFOL 20 MG: 10 INJECTION, EMULSION INTRAVENOUS at 12:04

## 2024-04-17 RX ADMIN — PROPOFOL 80 MG: 10 INJECTION, EMULSION INTRAVENOUS at 12:04

## 2024-04-17 NOTE — DISCHARGE SUMMARY
[facility] - Periop Services  Discharge Note  Short Stay    Procedure(s) (LRB):  Block-nerve-medial branch-cervical (Bilateral)      OUTCOME: Patient tolerated treatment/procedure well without complication and is now ready for discharge.    DISPOSITION: Home or Self Care    FINAL DIAGNOSIS:  <principal problem not specified>    FOLLOWUP: In clinic    DISCHARGE INSTRUCTIONS:  No discharge procedures on file.     TIME SPENT ON DISCHARGE: 5 minutes

## 2024-04-17 NOTE — TRANSFER OF CARE
"Anesthesia Transfer of Care Note    Patient: Justin Del Cid    Procedure(s) Performed: Procedure(s) (LRB):  Block-nerve-medial branch-cervical (Bilateral)    Patient location: OPS    Anesthesia Type: MAC    Transport from OR: Transported from OR on room air with adequate spontaneous ventilation    Post pain: adequate analgesia    Post assessment: no apparent anesthetic complications    Post vital signs: stable    Level of consciousness: awake, alert and oriented    Nausea/Vomiting: no nausea/vomiting    Complications: none    Transfer of care protocol was followed      Last vitals: Visit Vitals  /71 (BP Location: Left arm, Patient Position: Lying)   Pulse 81   Temp 36.4 °C (97.5 °F) (Tympanic)   Resp 16   Ht 5' 7" (1.702 m)   Wt 87.4 kg (192 lb 10.9 oz)   SpO2 100%   BMI 30.18 kg/m²     "

## 2024-04-17 NOTE — ANESTHESIA POSTPROCEDURE EVALUATION
Anesthesia Post Evaluation    Patient: Justin Del Cid    Procedure(s) Performed: Procedure(s) (LRB):  Block-nerve-medial branch-cervical (Bilateral)    Final Anesthesia Type: general      Patient location during evaluation: PACU  Patient participation: Yes- Able to Participate  Level of consciousness: awake  Post-procedure vital signs: reviewed and stable  Pain management: adequate  Airway patency: patent    PONV status at discharge: vomiting (controlled) and nausea (controlled)  Anesthetic complications: no      Cardiovascular status: hemodynamically stable  Respiratory status: spontaneous ventilation and unassisted  Hydration status: euvolemic  Follow-up not needed.  Comments:                  Vitals Value Taken Time   /77 04/17/24 1300   Temp 37 °C (98.6 °F) 04/17/24 1300   Pulse 81 04/17/24 1300   Resp 20 04/17/24 1300   SpO2 97 % 04/17/24 1300         No case tracking events are documented in the log.      Pain/Belkis Score: No data recorded

## 2024-04-17 NOTE — ANESTHESIA PREPROCEDURE EVALUATION
"                                                                                                             04/17/2024  Justin Del Cid is a 73 y.o., male.  Pre-operative evaluation for Procedure(s) (LRB):  Block-nerve-medial branch-cervical (Bilateral)    /77   Pulse 85   Temp 36.4 °C (97.5 °F) (Tympanic)   Resp 20   Ht 5' 7" (1.702 m)   Wt 87.4 kg (192 lb 10.9 oz)   SpO2 95%   BMI 30.18 kg/m²     Past Medical History:   Diagnosis Date    Anxiety     COPD (chronic obstructive pulmonary disease)     Coronary artery disease 2004    ANGIOPLASTY    GERD (gastroesophageal reflux disease)     Hyperlipidemia     Hypertension     Neck pain     Obesity, unspecified     BMI  31.32    Obsessive-compulsive disorder     Prostate enlargement     Sinusitis        Patient Active Problem List   Diagnosis    Cervicalgia    Cervical radiculitis    DDD (degenerative disc disease), cervical       Review of patient's allergies indicates:   Allergen Reactions    Iodine     Shrimp Rash       Current Outpatient Medications   Medication Instructions    acetaminophen (TYLENOL) 500 mg, Oral, Every 6 hours PRN    albuterol (PROVENTIL) 2.5 mg, Nebulization, Every 6 hours PRN, Rescue    ALBUTEROL INHL 2 puffs, Inhalation, Every 4 hours PRN    amLODIPine (NORVASC) 10 mg, Oral, Daily    amlodipine-benazepril 10-20mg (LOTREL) 10-20 mg per capsule 1 capsule, Oral, Daily    aspirin 81 mg, Oral, Daily    cetirizine (ZYRTEC) 10 mg, Oral, Daily    EScitalopram oxalate (LEXAPRO) 20 mg, Oral, As needed (PRN)    fluticasone-salmeterol diskus inhaler 100-50 mcg 1 puff, Inhalation, 2 times daily PRN, Controller    losartan (COZAAR) 100 mg, Oral, Daily    metoprolol succinate (TOPROL-XL) 25 mg, Oral, Daily    naproxen sodium (ANAPROX) 220 mg, Oral, 2 times daily with meals    omeprazole (PRILOSEC) 40 mg, Oral, Daily    omeprazole (PRILOSEC) 40 mg    rosuvastatin (CRESTOR) 5 mg, Oral, Nightly    sildenafiL (VIAGRA) 50 mg    tamsulosin (FLOMAX) 0.4 " "mg, Oral, Nightly    triamcinolone acetonide 0.1% (KENALOG) 0.1 % cream APPLY SMALL AMOUNT TO THE SKIN TWICE A DAY AS DIRECTED TO NECK FOR 2 WEEKS       Past Surgical History:   Procedure Laterality Date    ABDOMINAL SURGERY      CARDIAC CATHETERIZATION      ANGIOPLASTY    CATARACT EXTRACTION W/  INTRAOCULAR LENS IMPLANT Bilateral     COLONOSCOPY      EPIDURAL STEROID INJECTION INTO CERVICAL SPINE N/A 10/11/2023    Procedure: Injection-steroid-epidural-cervical;  Surgeon: Nicky Blair MD;  Location: [facility] OR;  Service: Pain Management;  Laterality: N/A;  C7 -T1    HERNIA REPAIR      INJECTION OF ANESTHETIC AGENT AROUND MEDIAL BRANCH NERVES INNERVATING CERVICAL FACET JOINT Bilateral 4/1/2024    Procedure: BLOCK, NERVE, FACET JOINT, CERVICAL, MEDIAL BRANCH  Bilateral c4 c6;  Surgeon: Nicky Blair MD;  Location: [facility] OR;  Service: Pain Management;  Laterality: Bilateral;  Bilateral MBB C4-C6         No results found for: "WBC", "HGB", "HCT", "MCV", "PLT"       BMP  No results found for: "NA", "K", "CHLORIDE", "CO2", "GLUCOSE", "BUN", "CREATININE", "CALCIUM", "ANIONGAP", "ESTGFRAFRICA", "EGFRNONAA"     INR  No results for input(s): "PT", "INR", "PROTIME", "APTT" in the last 72 hours.        Diagnostic Studies:      EKG:  No results found for this or any previous visit.    No results found for this or any previous visit.            Pre-op Assessment    I have reviewed the Patient Summary Reports.    I have reviewed the NPO Status.   I have reviewed the Medications.     Review of Systems  Anesthesia Hx:  No problems with previous Anesthesia             Denies Family Hx of Anesthesia complications.    Denies Personal Hx of Anesthesia complications.                    Cardiovascular:  Cardiovascular Normal                   No Cardiac Complaints                         Pulmonary:  Pulmonary Normal        No Pulmonary Complaints               Hepatic/GI:        No Current GERD Sx              Physical Exam  General: " Alert and Oriented    Airway:  Mallampati: II   Mouth Opening: Normal  TM Distance: Normal  Tongue: Normal  Neck ROM: Normal ROM    Dental:  Edentulous    Chest/Lungs:  Clear to auscultation, Normal Respiratory Rate    Heart:  Rate: Normal  Rhythm: Regular Rhythm        Anesthesia Plan  Type of Anesthesia, risks & benefits discussed:    Anesthesia Type: Gen Natural Airway  Intra-op Monitoring Plan: Standard ASA Monitors  Post Op Pain Control Plan: multimodal analgesia  Induction:  IV  Airway Plan: Direct  Informed Consent: Informed consent signed with the Patient and all parties understand the risks and agree with anesthesia plan.  All questions answered.   ASA Score: 3  Day of Surgery Review of History & Physical: H&P Update referred to the surgeon/provider.  Anesthesia Plan Notes: Nasal cannula vs facemask supplemental oxygenation   For patients with CELESTINA/obesity, may consider SuperNoval Nasal CPAP      Poss conversion to General LMA/JUAN discussed          Ready For Surgery From Anesthesia Perspective.     .

## 2024-04-23 ENCOUNTER — ANESTHESIA EVENT (OUTPATIENT)
Dept: SURGERY | Facility: HOSPITAL | Age: 74
End: 2024-04-23
Payer: OTHER GOVERNMENT

## 2024-05-01 ENCOUNTER — HOSPITAL ENCOUNTER (OUTPATIENT)
Facility: HOSPITAL | Age: 74
Discharge: HOME | End: 2024-05-01
Attending: ANESTHESIOLOGY | Admitting: ANESTHESIOLOGY
Payer: OTHER GOVERNMENT

## 2024-05-01 ENCOUNTER — ANESTHESIA (OUTPATIENT)
Dept: SURGERY | Facility: HOSPITAL | Age: 74
End: 2024-05-01
Payer: OTHER GOVERNMENT

## 2024-05-01 DIAGNOSIS — M54.2 CERVICALGIA: Primary | ICD-10-CM

## 2024-05-01 DIAGNOSIS — G89.4 CHRONIC PAIN SYNDROME: ICD-10-CM

## 2024-05-01 PROCEDURE — D9220A PRA ANESTHESIA: Mod: CRNA,,,

## 2024-05-01 PROCEDURE — 64634 DESTROY C/TH FACET JNT ADDL: CPT | Mod: LT | Performed by: ANESTHESIOLOGY

## 2024-05-01 PROCEDURE — 64633 DESTROY CERV/THOR FACET JNT: CPT | Mod: LT | Performed by: ANESTHESIOLOGY

## 2024-05-01 PROCEDURE — 25000003 PHARM REV CODE 250

## 2024-05-01 PROCEDURE — 37000009 HC ANESTHESIA EA ADD 15 MINS: Performed by: ANESTHESIOLOGY

## 2024-05-01 PROCEDURE — 63600175 PHARM REV CODE 636 W HCPCS: Performed by: ANESTHESIOLOGY

## 2024-05-01 PROCEDURE — 64634 DESTROY C/TH FACET JNT ADDL: CPT | Mod: LT,,, | Performed by: ANESTHESIOLOGY

## 2024-05-01 PROCEDURE — 64633 DESTROY CERV/THOR FACET JNT: CPT | Mod: LT,,, | Performed by: ANESTHESIOLOGY

## 2024-05-01 PROCEDURE — 63600175 PHARM REV CODE 636 W HCPCS

## 2024-05-01 PROCEDURE — D9220A PRA ANESTHESIA: Mod: ANES,,, | Performed by: ANESTHESIOLOGY

## 2024-05-01 PROCEDURE — 37000008 HC ANESTHESIA 1ST 15 MINUTES: Performed by: ANESTHESIOLOGY

## 2024-05-01 PROCEDURE — 25000003 PHARM REV CODE 250: Performed by: ANESTHESIOLOGY

## 2024-05-01 RX ORDER — BUPIVACAINE HYDROCHLORIDE 2.5 MG/ML
INJECTION, SOLUTION EPIDURAL; INFILTRATION; INTRACAUDAL
Status: DISCONTINUED | OUTPATIENT
Start: 2024-05-01 | End: 2024-05-01 | Stop reason: HOSPADM

## 2024-05-01 RX ORDER — SODIUM CHLORIDE, SODIUM GLUCONATE, SODIUM ACETATE, POTASSIUM CHLORIDE AND MAGNESIUM CHLORIDE 30; 37; 368; 526; 502 MG/100ML; MG/100ML; MG/100ML; MG/100ML; MG/100ML
INJECTION, SOLUTION INTRAVENOUS CONTINUOUS
Status: DISCONTINUED | OUTPATIENT
Start: 2024-05-01 | End: 2024-05-01 | Stop reason: HOSPADM

## 2024-05-01 RX ORDER — TRIAMCINOLONE ACETONIDE 40 MG/ML
INJECTION, SUSPENSION INTRA-ARTICULAR; INTRAMUSCULAR
Status: DISCONTINUED | OUTPATIENT
Start: 2024-05-01 | End: 2024-05-01 | Stop reason: HOSPADM

## 2024-05-01 RX ORDER — TRIAMCINOLONE ACETONIDE 40 MG/ML
INJECTION, SUSPENSION INTRA-ARTICULAR; INTRAMUSCULAR
Status: DISCONTINUED
Start: 2024-05-01 | End: 2024-05-01 | Stop reason: HOSPADM

## 2024-05-01 RX ORDER — PROPOFOL 10 MG/ML
VIAL (ML) INTRAVENOUS
Status: DISCONTINUED | OUTPATIENT
Start: 2024-05-01 | End: 2024-05-01

## 2024-05-01 RX ORDER — LIDOCAINE HYDROCHLORIDE 10 MG/ML
INJECTION, SOLUTION EPIDURAL; INFILTRATION; INTRACAUDAL; PERINEURAL
Status: DISCONTINUED | OUTPATIENT
Start: 2024-05-01 | End: 2024-05-01

## 2024-05-01 RX ORDER — KETAMINE HYDROCHLORIDE 100 MG/ML
INJECTION, SOLUTION INTRAMUSCULAR; INTRAVENOUS
Status: DISCONTINUED | OUTPATIENT
Start: 2024-05-01 | End: 2024-05-01

## 2024-05-01 RX ORDER — LIDOCAINE HYDROCHLORIDE 10 MG/ML
INJECTION, SOLUTION EPIDURAL; INFILTRATION; INTRACAUDAL; PERINEURAL
Status: DISCONTINUED
Start: 2024-05-01 | End: 2024-05-01 | Stop reason: HOSPADM

## 2024-05-01 RX ORDER — BUPIVACAINE HYDROCHLORIDE 2.5 MG/ML
INJECTION, SOLUTION EPIDURAL; INFILTRATION; INTRACAUDAL
Status: DISCONTINUED
Start: 2024-05-01 | End: 2024-05-01 | Stop reason: HOSPADM

## 2024-05-01 RX ORDER — LIDOCAINE HYDROCHLORIDE 10 MG/ML
INJECTION, SOLUTION EPIDURAL; INFILTRATION; INTRACAUDAL; PERINEURAL
Status: DISCONTINUED | OUTPATIENT
Start: 2024-05-01 | End: 2024-05-01 | Stop reason: HOSPADM

## 2024-05-01 RX ADMIN — KETAMINE HYDROCHLORIDE 15 MG: 100 INJECTION, SOLUTION INTRAMUSCULAR; INTRAVENOUS at 08:05

## 2024-05-01 RX ADMIN — SODIUM CHLORIDE, SODIUM GLUCONATE, SODIUM ACETATE, POTASSIUM CHLORIDE AND MAGNESIUM CHLORIDE: 526; 502; 368; 37; 30 INJECTION, SOLUTION INTRAVENOUS at 08:05

## 2024-05-01 RX ADMIN — PROPOFOL 40 MG: 10 INJECTION, EMULSION INTRAVENOUS at 08:05

## 2024-05-01 RX ADMIN — PROPOFOL 30 MG: 10 INJECTION, EMULSION INTRAVENOUS at 08:05

## 2024-05-01 RX ADMIN — SODIUM CHLORIDE, SODIUM GLUCONATE, SODIUM ACETATE, POTASSIUM CHLORIDE AND MAGNESIUM CHLORIDE: 526; 502; 368; 37; 30 INJECTION, SOLUTION INTRAVENOUS at 07:05

## 2024-05-01 RX ADMIN — LIDOCAINE HYDROCHLORIDE 50 MG: 10 INJECTION, SOLUTION EPIDURAL; INFILTRATION; INTRACAUDAL; PERINEURAL at 08:05

## 2024-05-01 RX ADMIN — KETAMINE HYDROCHLORIDE 10 MG: 100 INJECTION, SOLUTION INTRAMUSCULAR; INTRAVENOUS at 08:05

## 2024-05-01 NOTE — PLAN OF CARE
Problem: Adult Inpatient Plan of Care  Goal: Plan of Care Review  5/1/2024 0953 by Santiago Lynn RN  Outcome: Met  5/1/2024 0903 by Santiago Lynn RN  Outcome: Progressing  Goal: Patient-Specific Goal (Individualized)  5/1/2024 0953 by Santiago Lynn RN  Outcome: Met  5/1/2024 0903 by Santiago Lynn RN  Outcome: Progressing  Goal: Absence of Hospital-Acquired Illness or Injury  5/1/2024 0953 by Santiago Lynn RN  Outcome: Met  5/1/2024 0903 by Santiago Lynn RN  Outcome: Progressing  Goal: Optimal Comfort and Wellbeing  5/1/2024 0953 by Santiago Lynn RN  Outcome: Met  5/1/2024 0903 by Santiago Lynn RN  Outcome: Progressing  Goal: Readiness for Transition of Care  5/1/2024 0953 by Santiago Lynn RN  Outcome: Met  5/1/2024 0903 by Santiago Lynn RN  Outcome: Progressing     Problem: Wound  Goal: Optimal Coping  5/1/2024 0953 by Santiago Lynn RN  Outcome: Met  5/1/2024 0903 by Santiago Lynn RN  Outcome: Progressing  Goal: Optimal Functional Ability  5/1/2024 0953 by Santiago Lynn RN  Outcome: Met  5/1/2024 0903 by Santiago Lynn RN  Outcome: Progressing  Goal: Absence of Infection Signs and Symptoms  5/1/2024 0953 by Santiago Lynn RN  Outcome: Met  5/1/2024 0903 by Santiago Lynn RN  Outcome: Progressing  Goal: Improved Oral Intake  5/1/2024 0953 by Santiago Lynn RN  Outcome: Met  5/1/2024 0903 by Santiago Lynn, RN  Outcome: Progressing  Goal: Optimal Pain Control and Function  5/1/2024 0953 by Santiago Lynn RN  Outcome: Met  5/1/2024 0903 by Santiago Lynn RN  Outcome: Progressing  Goal: Skin Health and Integrity  5/1/2024 0953 by Santiago Lynn RN  Outcome: Met  5/1/2024 0903 by Santiago Lynn, RN  Outcome: Progressing  Goal: Optimal Wound Healing  5/1/2024 0953 by Santiago Lynn, RN  Outcome: Met  5/1/2024 0903 by Santiago Lynn, RN  Outcome: Progressing   Pt tolerated procedure well, ready for discharge

## 2024-05-01 NOTE — TRANSFER OF CARE
"Anesthesia Transfer of Care Note    Patient: Justin Del Cid    Procedure(s) Performed: Procedure(s) (LRB):  Radiofrequency Ablation (Left)    Patient location: [facility]    Anesthesia Type: MAC    Transport from OR: Transported from OR on room air with adequate spontaneous ventilation    Post pain: adequate analgesia    Post assessment: no apparent anesthetic complications and tolerated procedure well    Post vital signs: stable    Level of consciousness: awake and alert    Nausea/Vomiting: no nausea/vomiting    Complications: none    Transfer of care protocol was followed      Last vitals: Visit Vitals  BP (!) 144/81   Pulse 64   Temp 36 °C (96.8 °F) (Tympanic)   Resp 20   Ht 5' 7" (1.702 m)   Wt 87 kg (191 lb 12.8 oz)   SpO2 98%   BMI 30.04 kg/m²     "

## 2024-05-01 NOTE — DISCHARGE SUMMARY
[facility] - Periop Services  Discharge Note  Short Stay    Procedure(s) (LRB):  Radiofrequency Ablation (Left)      OUTCOME: Patient tolerated treatment/procedure well without complication and is now ready for discharge.    DISPOSITION: Home or Self Care    FINAL DIAGNOSIS:  <principal problem not specified>    FOLLOWUP: In clinic    DISCHARGE INSTRUCTIONS:  No discharge procedures on file.     TIME SPENT ON DISCHARGE: 5 minutes

## 2024-05-01 NOTE — OP NOTE
Left C4-6 medial branch radiofrequency ablation    Pre-Procedure Diagnoses:  1. Chronic pain syndrome  2. Cervicalgia  3. Cervical facet arthropathy    Post-Procedure Diagnoses:  1. Chronic pain syndrome  2. Cervicalgia  3. Cervical facet arthropathy    Anesthesia:  Local and MAC    Estimated Blood Loss:  None    Complications:  None    Informed Consent:  The procedure, risks, benefits, and alternatives were discussed with the patient. There were no contraindications to the procedure. The patient expressed understanding and agreed to proceed. Fully informed written consent was obtained.     Description of the Procedure:  The patient was taken to the operating room. IV access was obtained prior to the start of the procedure. The patient was positioned prone on the fluoroscopy table. Continuous hemodynamic monitoring was initiated and continued throughout the duration of the procedure. The skin overlying the cervicothoracic spine was prepped with Chloraprep and draped into a sterile field. Fluoroscopy was used to identify the locations of the left side C4, C5, and C6 medial branch nerves. Skin anesthesia was achieved using 0.5 mL of 1% lidocaine over each injection site. A 20-gauge 100-mm radiofrequency needle was slowly inserted and advanced under intermittent fluoroscopic guidance until it made bony contact at the target sites. Proper needle position was confirmed under AP, oblique, and lateral fluoroscopic views. Negative aspiration for blood or CSF was confirmed. Then a radiofrequency probe was inserted through each needle. The impedence values were low.  Motor testing was performed which confirmed no stimulation of the upper extremity.  Radiofrequency lesioning was then carried out at 80 degrees Celsius for 90 seconds at each level.  Then a combination of 10 mg of Kenalog and 0.5 mL of 0.25% bupivacaine was injected at each level.  There was no pain on injection. The needles were removed intact and bleeding was  nil. Sterile bandages were applied. The patient was taken to the recovery room for further observation in stable condition. The patient was then discharged home without any complications.

## 2024-05-01 NOTE — ANESTHESIA PREPROCEDURE EVALUATION
05/01/2024  Justin Del Cid is a 73 y.o., male.  Procedure Information    Case: [id number] Date/Time: 05/01/24 0754   Procedure: Radiofrequency Ablation (Left) - RFA Lt C4-C6 ([place])   Anesthesia type: Local MAC   Diagnosis:      Arthropathy of cervical facet joint [M47.812]      Cervical radiculopathy [M54.12]      DDD (degenerative disc disease), cervical [M50.30]   Pre-op diagnosis:      Arthropathy of cervical facet joint [M47.812]      Cervical radiculopathy [M54.12]      DDD (degenerative disc disease), cervical [M50.30]   [facility]  [facility]   Surgeons: Nicky Blair MD       Pre-op Assessment    I have reviewed the Patient Summary Reports.     I have reviewed the Nursing Notes. I have reviewed the NPO Status.   I have reviewed the Medications.     Review of Systems  Anesthesia Hx:  No problems with previous Anesthesia                Hematology/Oncology:  Hematology Normal   Oncology Normal                                   EENT/Dental:  EENT/Dental Normal           Cardiovascular:  Exercise tolerance: good   Hypertension   CAD  asymptomatic              Functional Capacity good / => 4 METS                         Pulmonary:   COPD                     Renal/:   Denies Chronic Renal Disease.                Hepatic/GI:     GERD             Musculoskeletal:  Arthritis               Neurological:  Neurology Normal                                      Endocrine:  Endocrine Normal          Denies Morbid Obesity / BMI > 40  Dermatological:  Skin Normal    Psych:  Psychiatric Normal                    Physical Exam  General: Alert, Oriented, Well nourished and Cooperative    Airway:  Mallampati: II   Mouth Opening: Normal  TM Distance: Normal  Tongue: Normal  Neck ROM: Normal ROM    Dental:  Intact    Chest/Lungs:  Clear to auscultation, Normal Respiratory Rate    Heart:  Rate: Normal  Rhythm:  Regular Rhythm        Anesthesia Plan  Type of Anesthesia, risks & benefits discussed:    Anesthesia Type: MAC, Gen Natural Airway  Intra-op Monitoring Plan: Standard ASA Monitors  Post Op Pain Control Plan: multimodal analgesia  Induction:  IV  Airway Plan: Direct  Informed Consent: Informed consent signed with the Patient and all parties understand the risks and agree with anesthesia plan.  All questions answered. Patient consented to blood products? Yes  ASA Score: 3  Day of Surgery Review of History & Physical: H&P Update referred to the surgeon/provider.I have interviewed and examined the patient. I have reviewed the patient's H&P dated: There are no significant changes.     Ready For Surgery From Anesthesia Perspective.     .

## 2024-05-01 NOTE — ANESTHESIA POSTPROCEDURE EVALUATION
Anesthesia Post Evaluation    Patient: Justin Del Cid    Procedure(s) Performed: Procedure(s) (LRB):  Radiofrequency Ablation (Left)    Final Anesthesia Type: MAC      Patient location during evaluation: PACU  Patient participation: Yes- Able to Participate  Level of consciousness: awake and alert and oriented  Post-procedure vital signs: reviewed and stable  Pain management: adequate  Airway patency: patent  CELESTINA mitigation strategies: Verification of full reversal of neuromuscular block  PONV status at discharge: No PONV  Anesthetic complications: no      Cardiovascular status: blood pressure returned to baseline and stable  Respiratory status: spontaneous ventilation and unassisted  Hydration status: euvolemic  Follow-up not needed.  Comments: [facility]              Vitals Value Taken Time   /78 05/01/24 0930   Temp 36.5 °C (97.7 °F) 05/01/24 0847   Pulse 63 05/01/24 0930   Resp 20 05/01/24 0930   SpO2 97 % 05/01/24 0930         No case tracking events are documented in the log.      Pain/Belkis Score: Belkis Score: 10 (5/1/2024  8:47 AM)  Modified Belkis Score: 20 (5/1/2024  9:30 AM)

## 2024-05-02 VITALS
TEMPERATURE: 98 F | OXYGEN SATURATION: 97 % | HEART RATE: 63 BPM | RESPIRATION RATE: 20 BRPM | DIASTOLIC BLOOD PRESSURE: 78 MMHG | SYSTOLIC BLOOD PRESSURE: 139 MMHG | HEIGHT: 67 IN | BODY MASS INDEX: 30.1 KG/M2 | WEIGHT: 191.81 LBS

## 2024-05-15 ENCOUNTER — OFFICE VISIT (OUTPATIENT)
Dept: PAIN MEDICINE | Facility: CLINIC | Age: 74
End: 2024-05-15
Payer: OTHER GOVERNMENT

## 2024-05-15 VITALS
BODY MASS INDEX: 29.98 KG/M2 | SYSTOLIC BLOOD PRESSURE: 122 MMHG | HEART RATE: 80 BPM | WEIGHT: 191 LBS | HEIGHT: 67 IN | DIASTOLIC BLOOD PRESSURE: 77 MMHG

## 2024-05-15 DIAGNOSIS — M54.12 CERVICAL RADICULITIS: ICD-10-CM

## 2024-05-15 DIAGNOSIS — M47.812 ARTHROPATHY OF CERVICAL FACET JOINT: Primary | ICD-10-CM

## 2024-05-15 DIAGNOSIS — M54.12 CERVICAL RADICULOPATHY: ICD-10-CM

## 2024-05-15 PROCEDURE — 99213 OFFICE O/P EST LOW 20 MIN: CPT | Mod: ,,, | Performed by: NURSE PRACTITIONER

## 2024-05-15 NOTE — PROGRESS NOTES
"Subjective:      Patient ID: Justin Del Cid is a 73 y.o. male.    Chief Complaint: Follow-up (F/u procedure 5/1/24 C/O pain level 2, states procedure helped a lot, Taking Aleve TIARAN.)    Referred by: Mayra Wilburn He*     HPI: This is a pleasant 73-year-old male  who presents as a postoperative visit for pain associated with cervical facet arthropathy after receiving a left radiofrequency ablation C4-C6  reported excellent pain relief since receiving this RFA.  He has more restorative sleep he does not have as much pain driving and turning his neck as well as looking up.  Pain is not waking him up.  He would like to proceed with a right C4-C6 radiofrequency ablation.  He does not have any pacemaker, defibrillator or spinal cord stimulator implanted.    Vital signs:   Vitals:    05/15/24 1056 05/15/24 1058   BP: 122/77    Pulse: 80    Weight: 86.6 kg (191 lb)    Height: 5' 7" (1.702 m)    PainSc:    2     Body mass index is 29.91 kg/m².  Pain Disability Index (PDI): 14       Interventional Pain History  05/01/2024: Left RFA C4-C6  04/17/2024:  Bilateral MBB C4-C6  04/01/2024: Bilateral MBB C4-C6  10/11/2023: KARLY C7-T1    ROS: Neck pain +bilateral trapezius    MRI cervical spine:   Please see Dr. Alessandro Magana's referrral summary for details         Objective:          Physical Exam     General: Well developed; normal weight. ; A&O x 3; No anxiety/depression; NAD  Mental Status: Oriented to person, palce and time. Displays appropriate mood & affect.  Head: Norm cephalic and atraumatic  Neck:  + bilateral cervical paraspinal banding. Full ROM neck turning to the right some discomfort remains on the right cervical axial spine.Full range of motion with lateral turning to right and with cervical flexion extension.able to raise both arms over head without pain   Eyes: normal conjunctiva, normal lids, normal pupils  ENT and mouth: normal external ear, nose, and no lesions noted on the " lips.  Respiratory: Symmetrical, Unlabored. No dyspnea  CV: normal rhythm and rate. No peripheral edema.   Abdomen: Non-distended     Extremities:  Gen: No cyanosis or tenderness to palpation bilateral upper and lower extremities  Skin: Warm, pink, dry, no rashes, no lesions on the lumbar spine  Strength: 5/5 motor strength bilateral upper and lower extremities  ROM: Full ROM in bilateral knees  without pain or instability.     Neuro:  Gait: no altalgic lean, normal toe and heel raise. Independent ambulator.  DTR's: 2+ in bilateral patellar, and ankle  Sensory: Intact to light touch bilateral  upper and lower extremities     Spine: Normal lordosis. No scoliosis  L-spine ROM: Full ROM to flexion, extension, bilateral rotation,   Straight Leg Raise Neg bilaterally  SI Joint: No tenderness to palpation bilaterally.  Assessment:      Patient has ongoing excellent pain relief to the left cervical axial spine region after receiving a RFA left C4-C6.  He has continued icing and states he has much better pain relief and has been enjoying greater than 80% relief of pain in his cervical axial spine region since receiving the left RFA.  He would like to proceed with a right radiofrequency ablation C4-C6.  To maximize his pain relief.  Request sent for right RFA C5-C6  Hold Advil/naproxen and Aspirin 7 days prior and resume post         Encounter Diagnoses   Name Primary?    Arthropathy of cervical facet joint Yes    Cervical radiculopathy          Plan:       Justin was seen today for follow-up.    Diagnoses and all orders for this visit:    Arthropathy of cervical facet joint    Cervical radiculopathy               Past Medical History:   Diagnosis Date    Anxiety     COPD (chronic obstructive pulmonary disease)     Coronary artery disease 2004    ANGIOPLASTY    GERD (gastroesophageal reflux disease)     Hyperlipidemia     Hypertension     Neck pain     Obesity, unspecified     BMI  31.32    Prostate enlargement     Sinusitis         Past Surgical History:   Procedure Laterality Date    ABDOMINAL SURGERY      CARDIAC CATHETERIZATION      ANGIOPLASTY    CATARACT EXTRACTION W/  INTRAOCULAR LENS IMPLANT Bilateral     COLONOSCOPY      EPIDURAL STEROID INJECTION INTO CERVICAL SPINE N/A 10/11/2023    Procedure: Injection-steroid-epidural-cervical;  Surgeon: Nicky Blair MD;  Location: [facility] OR;  Service: Pain Management;  Laterality: N/A;  C7 -T1    HERNIA REPAIR      INJECTION OF ANESTHETIC AGENT AROUND MEDIAL BRANCH NERVES INNERVATING CERVICAL FACET JOINT Bilateral 4/1/2024    Procedure: BLOCK, NERVE, FACET JOINT, CERVICAL, MEDIAL BRANCH  Bilateral c4 c6;  Surgeon: Nicky Blair MD;  Location: [facility] OR;  Service: Pain Management;  Laterality: Bilateral;  Bilateral MBB C4-C6    INJECTION OF ANESTHETIC AGENT AROUND MEDIAL BRANCH NERVES INNERVATING CERVICAL FACET JOINT Bilateral 4/17/2024    Procedure: Block-nerve-medial branch-cervical;  Surgeon: Nicky Blair MD;  Location: [facility] OR;  Service: Pain Management;  Laterality: Bilateral;  B[place] [facility]BB C4-C6 ([facility])    RADIOFREQUENCY ABLATION Left 5/1/2024    Procedure: Radiofrequency Ablation;  Surgeon: Nicky Blair MD;  Location: [facility] OR;  Service: Pain Management;  Laterality: Left;  RFA Lt C4-C6 ([facility])       Family History   Problem Relation Name Age of Onset    No Known Problems Mother      Cancer Father      COPD Sister         Social History     Socioeconomic History    Marital status:    Tobacco Use    Smoking status: Every Day     Current packs/day: 1.00     Average packs/day: 1 pack/day for 49.4 years (49.4 ttl pk-yrs)     Types: Cigarettes     Start date: 1975    Smokeless tobacco: Never   Substance and Sexual Activity    Alcohol use: Not Currently    Drug use: Never    Sexual activity: Yes       Current Outpatient Medications   Medication Sig Dispense Refill    acetaminophen (TYLENOL) 500 MG tablet Take 500 mg by mouth every 6 (six) hours as needed for Pain.       albuterol (PROVENTIL) 2.5 mg /3 mL (0.083 %) nebulizer solution Take 2.5 mg by nebulization every 6 (six) hours as needed for Wheezing. Rescue      ALBUTEROL INHL Inhale 2 puffs into the lungs every 4 (four) hours as needed.      amLODIPine (NORVASC) 10 MG tablet Take 10 mg by mouth once daily.      amlodipine-benazepril 10-20mg (LOTREL) 10-20 mg per capsule Take 1 capsule by mouth once daily.      aspirin 81 MG Chew Take 81 mg by mouth once daily.      cetirizine (ZYRTEC) 10 MG tablet Take 10 mg by mouth once daily.      EScitalopram oxalate (LEXAPRO) 20 MG tablet Take 20 mg by mouth as needed.      fluticasone-salmeterol diskus inhaler 100-50 mcg Inhale 1 puff into the lungs 2 (two) times daily as needed. Controller      losartan (COZAAR) 100 MG tablet Take 100 mg by mouth once daily.      metoprolol succinate (TOPROL-XL) 25 MG 24 hr tablet Take 25 mg by mouth once daily.      naproxen sodium (ANAPROX) 220 MG tablet Take 220 mg by mouth 2 (two) times daily with meals.      omeprazole (PRILOSEC) 20 MG capsule 40 mg.      omeprazole (PRILOSEC) 40 MG capsule Take 40 mg by mouth once daily.      rosuvastatin (CRESTOR) 5 MG tablet Take 5 mg by mouth every evening.      sildenafiL (VIAGRA) 100 MG tablet 50 mg.      tamsulosin (FLOMAX) 0.4 mg Cap Take 0.4 mg by mouth nightly.      triamcinolone acetonide 0.1% (KENALOG) 0.1 % cream APPLY SMALL AMOUNT TO THE SKIN TWICE A DAY AS DIRECTED TO NECK FOR 2 WEEKS       No current facility-administered medications for this visit.       Review of patient's allergies indicates:   Allergen Reactions    Iodine     Shrimp Rash

## 2024-06-19 ENCOUNTER — OFFICE VISIT (OUTPATIENT)
Dept: PAIN MEDICINE | Facility: CLINIC | Age: 74
End: 2024-06-19
Payer: OTHER GOVERNMENT

## 2024-06-19 VITALS
DIASTOLIC BLOOD PRESSURE: 72 MMHG | HEIGHT: 67 IN | SYSTOLIC BLOOD PRESSURE: 120 MMHG | WEIGHT: 191 LBS | TEMPERATURE: 98 F | BODY MASS INDEX: 29.98 KG/M2 | HEART RATE: 82 BPM

## 2024-06-19 DIAGNOSIS — M54.12 CERVICAL RADICULITIS: ICD-10-CM

## 2024-06-19 DIAGNOSIS — M47.812 ARTHROPATHY OF CERVICAL FACET JOINT: Primary | ICD-10-CM

## 2024-06-19 PROCEDURE — 99214 OFFICE O/P EST MOD 30 MIN: CPT | Mod: ,,, | Performed by: NURSE PRACTITIONER

## 2024-06-19 NOTE — PROGRESS NOTES
[facility]  Pre-Operative Clinic Note      SUBJECTIVE    CHIEF COMPLAINT: Pre-op Exam (Pre op procedure 7/10/24 C/O pain level 3, Taking Tylenol for pain )       History of Present Illness: 73 y.o. male presents today for preoperative evaluation for right radiofrequency ablation C4-C6 on 07/10/2024. . I reviewed the indications for procedure. The risks and benefits of the proposed and alternative treatments were discussed with the patient. Questions pertinent to the procedure were solicited and answered. No assurances were given. Informed consent was obtained. The patient expressed good understanding and wished to proceed with scheduling the procedure.     Review of Systems:   Constitutional: No fever, weakness, or fatigue.   Ear/Nose/Mouth/Throat: No nasal congestion or sore throat.   Respiratory: No shortness of breath or cough.   Cardiovascular: No chest pain, palpitations, or peripheral edema.   Gastrointestinal: No nausea, vomiting, or abdominal pain.   Genitourinary: No dysuria.  Musculoskeletal:  worse to the neck when he is driving and looking side-to-side as well as looking up and down.    Past Surgical History:   Procedure Laterality Date    ABDOMINAL SURGERY      CARDIAC CATHETERIZATION      ANGIOPLASTY    CATARACT EXTRACTION W/  INTRAOCULAR LENS IMPLANT Bilateral     COLONOSCOPY      EPIDURAL STEROID INJECTION INTO CERVICAL SPINE N/A 10/11/2023    Procedure: Injection-steroid-epidural-cervical;  Surgeon: Nicky Blair MD;  Location: [facility] OR;  Service: Pain Management;  Laterality: N/A;  C7 -T1    HERNIA REPAIR      INJECTION OF ANESTHETIC AGENT AROUND MEDIAL BRANCH NERVES INNERVATING CERVICAL FACET JOINT Bilateral 4/1/2024    Procedure: BLOCK, NERVE, FACET JOINT, CERVICAL, MEDIAL BRANCH  Bilateral c4 c6;  Surgeon: Nicky Blair MD;  Location: [facility] OR;  Service: Pain Management;  Laterality: Bilateral;  Bilateral MBB C4-C6    INJECTION OF ANESTHETIC AGENT AROUND MEDIAL BRANCH NERVES  "INNERVATING CERVICAL FACET JOINT Bilateral 4/17/2024    Procedure: Block-nerve-medial branch-cervical;  Surgeon: Nicky Bliar MD;  Location: [facility] OR;  Service: Pain Management;  Laterality: Bilateral;  Bilateral MBB C4-C6 ([facility])    RADIOFREQUENCY ABLATION Left 5/1/2024    Procedure: Radiofrequency Ablation;  Surgeon: Nicky Blair MD;  Location: [facility] OR;  Service: Pain Management;  Laterality: Left;  RFA Lt C4-C6 ([facility])        Past Medical History:   Diagnosis Date    Anxiety     COPD (chronic obstructive pulmonary disease)     Coronary artery disease 2004    ANGIOPLASTY    GERD (gastroesophageal reflux disease)     Hyperlipidemia     Hypertension     Neck pain     Obesity, unspecified     BMI  31.32    Prostate enlargement     Sinusitis         OBJECTIVE:    Visit Vitals  /72 (BP Location: Right arm, Patient Position: Sitting, BP Method: Medium (Automatic))   Pulse 82   Temp 98 °F (36.7 °C)   Ht 5' 7" (1.702 m)   Wt 86.6 kg (191 lb)   BMI 29.91 kg/m²     Vitals:    06/19/24 1056   PainSc:   3       Physical Exam:   General: Well-developed, well-nourished.  Neuro: Alert and oriented x 3.  Psych: Normal mood and affect.  HEENT: Normocephalic. PERRLA EOM intact. Nose and throat clear.  Lungs: Clear to auscultation and percussion.  Heart: Regular rate and rhythm   Abdomen: Soft non-tender. Bowel sounds positive. No rebound tenderness.  Skin: No rashes or open wounds  Musculoskeletal:    ASSESSMENT:  1. Arthropathy of cervical facet joint    2. Cervical radiculitis       PLAN:  Plan for to proceed with right radiofrequency ablation C4-C6 on 07/10/2024.   [x] The patient has been given preoperative instructions and prescriptions for post-operative medication.   [x] Medications to hold:  81 mg aspirin and naproxen 7 days prior to procedure and resume postop.  [] Cardiac clearance received and reviewed.  [x] Post-operative appointment is scheduled for 2 weeks.    "

## 2024-06-19 NOTE — PATIENT INSTRUCTIONS
Medications to hold:  81 mg aspirin and naproxen 7 days prior to procedure and resume postop.      After you receive this radiofrequency ablation on the right C4-C6 you will have anticipated postop swelling and pain to that area of your neck on the right.  I would like for you to ice this area 20 minutes on and 20 minutes off as often as you can throughout the day to reduce this anticipated swelling.  It may take up to a month for all the swelling to go down.

## 2024-06-19 NOTE — H&P (VIEW-ONLY)
[facility]  Pre-Operative Clinic Note      SUBJECTIVE    CHIEF COMPLAINT: Pre-op Exam (Pre op procedure 7/10/24 C/O pain level 3, Taking Tylenol for pain )       History of Present Illness: 73 y.o. male presents today for preoperative evaluation for right radiofrequency ablation C4-C6 on 07/10/2024. . I reviewed the indications for procedure. The risks and benefits of the proposed and alternative treatments were discussed with the patient. Questions pertinent to the procedure were solicited and answered. No assurances were given. Informed consent was obtained. The patient expressed good understanding and wished to proceed with scheduling the procedure.     Review of Systems:   Constitutional: No fever, weakness, or fatigue.   Ear/Nose/Mouth/Throat: No nasal congestion or sore throat.   Respiratory: No shortness of breath or cough.   Cardiovascular: No chest pain, palpitations, or peripheral edema.   Gastrointestinal: No nausea, vomiting, or abdominal pain.   Genitourinary: No dysuria.  Musculoskeletal:  worse to the neck when he is driving and looking side-to-side as well as looking up and down.    Past Surgical History:   Procedure Laterality Date    ABDOMINAL SURGERY      CARDIAC CATHETERIZATION      ANGIOPLASTY    CATARACT EXTRACTION W/  INTRAOCULAR LENS IMPLANT Bilateral     COLONOSCOPY      EPIDURAL STEROID INJECTION INTO CERVICAL SPINE N/A 10/11/2023    Procedure: Injection-steroid-epidural-cervical;  Surgeon: Nicky Blair MD;  Location: [facility] OR;  Service: Pain Management;  Laterality: N/A;  C7 -T1    HERNIA REPAIR      INJECTION OF ANESTHETIC AGENT AROUND MEDIAL BRANCH NERVES INNERVATING CERVICAL FACET JOINT Bilateral 4/1/2024    Procedure: BLOCK, NERVE, FACET JOINT, CERVICAL, MEDIAL BRANCH  Bilateral c4 c6;  Surgeon: Nicky Blair MD;  Location: [facility] OR;  Service: Pain Management;  Laterality: Bilateral;  Bilateral MBB C4-C6    INJECTION OF ANESTHETIC AGENT AROUND MEDIAL BRANCH NERVES  "INNERVATING CERVICAL FACET JOINT Bilateral 4/17/2024    Procedure: Block-nerve-medial branch-cervical;  Surgeon: Nicky Blair MD;  Location: [facility] OR;  Service: Pain Management;  Laterality: Bilateral;  Bilateral MBB C4-C6 ([facility])    RADIOFREQUENCY ABLATION Left 5/1/2024    Procedure: Radiofrequency Ablation;  Surgeon: Nicky Blair MD;  Location: [facility] OR;  Service: Pain Management;  Laterality: Left;  RFA Lt C4-C6 ([facility])        Past Medical History:   Diagnosis Date    Anxiety     COPD (chronic obstructive pulmonary disease)     Coronary artery disease 2004    ANGIOPLASTY    GERD (gastroesophageal reflux disease)     Hyperlipidemia     Hypertension     Neck pain     Obesity, unspecified     BMI  31.32    Prostate enlargement     Sinusitis         OBJECTIVE:    Visit Vitals  /72 (BP Location: Right arm, Patient Position: Sitting, BP Method: Medium (Automatic))   Pulse 82   Temp 98 °F (36.7 °C)   Ht 5' 7" (1.702 m)   Wt 86.6 kg (191 lb)   BMI 29.91 kg/m²     Vitals:    06/19/24 1056   PainSc:   3       Physical Exam:   General: Well-developed, well-nourished.  Neuro: Alert and oriented x 3.  Psych: Normal mood and affect.  HEENT: Normocephalic. PERRLA EOM intact. Nose and throat clear.  Lungs: Clear to auscultation and percussion.  Heart: Regular rate and rhythm   Abdomen: Soft non-tender. Bowel sounds positive. No rebound tenderness.  Skin: No rashes or open wounds  Musculoskeletal:    ASSESSMENT:  1. Arthropathy of cervical facet joint    2. Cervical radiculitis       PLAN:  Plan for to proceed with right radiofrequency ablation C4-C6 on 07/10/2024.   [x] The patient has been given preoperative instructions and prescriptions for post-operative medication.   [x] Medications to hold:  81 mg aspirin and naproxen 7 days prior to procedure and resume postop.  [] Cardiac clearance received and reviewed.  [x] Post-operative appointment is scheduled for 2 weeks.    "

## 2024-06-28 ENCOUNTER — ANESTHESIA EVENT (OUTPATIENT)
Dept: SURGERY | Facility: HOSPITAL | Age: 74
End: 2024-06-28
Payer: OTHER GOVERNMENT

## 2024-07-10 ENCOUNTER — ANESTHESIA (OUTPATIENT)
Dept: SURGERY | Facility: HOSPITAL | Age: 74
End: 2024-07-10
Payer: OTHER GOVERNMENT

## 2024-07-10 ENCOUNTER — HOSPITAL ENCOUNTER (OUTPATIENT)
Facility: HOSPITAL | Age: 74
Discharge: HOME | End: 2024-07-10
Attending: ANESTHESIOLOGY | Admitting: ANESTHESIOLOGY
Payer: OTHER GOVERNMENT

## 2024-07-10 VITALS
HEIGHT: 67 IN | RESPIRATION RATE: 18 BRPM | HEART RATE: 65 BPM | TEMPERATURE: 97 F | DIASTOLIC BLOOD PRESSURE: 68 MMHG | WEIGHT: 191.38 LBS | OXYGEN SATURATION: 96 % | SYSTOLIC BLOOD PRESSURE: 133 MMHG | BODY MASS INDEX: 30.04 KG/M2

## 2024-07-10 DIAGNOSIS — G89.29 CHRONIC BACK PAIN GREATER THAN 3 MONTHS DURATION: ICD-10-CM

## 2024-07-10 DIAGNOSIS — M54.9 CHRONIC BACK PAIN GREATER THAN 3 MONTHS DURATION: ICD-10-CM

## 2024-07-10 PROCEDURE — 63600175 PHARM REV CODE 636 W HCPCS: Mod: JZ,JG | Performed by: ANESTHESIOLOGY

## 2024-07-10 PROCEDURE — 37000008 HC ANESTHESIA 1ST 15 MINUTES: Performed by: ANESTHESIOLOGY

## 2024-07-10 PROCEDURE — 64633 DESTROY CERV/THOR FACET JNT: CPT | Mod: RT,,, | Performed by: ANESTHESIOLOGY

## 2024-07-10 PROCEDURE — 64633 DESTROY CERV/THOR FACET JNT: CPT | Mod: RT | Performed by: ANESTHESIOLOGY

## 2024-07-10 PROCEDURE — 37000009 HC ANESTHESIA EA ADD 15 MINS: Performed by: ANESTHESIOLOGY

## 2024-07-10 PROCEDURE — 25000003 PHARM REV CODE 250: Performed by: NURSE ANESTHETIST, CERTIFIED REGISTERED

## 2024-07-10 PROCEDURE — 25000003 PHARM REV CODE 250: Performed by: ANESTHESIOLOGY

## 2024-07-10 PROCEDURE — 63600175 PHARM REV CODE 636 W HCPCS: Performed by: NURSE ANESTHETIST, CERTIFIED REGISTERED

## 2024-07-10 PROCEDURE — D9220A PRA ANESTHESIA: Mod: ,,, | Performed by: NURSE ANESTHETIST, CERTIFIED REGISTERED

## 2024-07-10 RX ORDER — ACETAMINOPHEN 325 MG/1
650 TABLET ORAL ONCE
Status: COMPLETED | OUTPATIENT
Start: 2024-07-10 | End: 2024-07-10

## 2024-07-10 RX ORDER — LIDOCAINE HYDROCHLORIDE 10 MG/ML
1 INJECTION, SOLUTION EPIDURAL; INFILTRATION; INTRACAUDAL; PERINEURAL ONCE
OUTPATIENT
Start: 2024-07-10 | End: 2024-07-10

## 2024-07-10 RX ORDER — ACETAMINOPHEN 10 MG/ML
1000 INJECTION, SOLUTION INTRAVENOUS ONCE
OUTPATIENT
Start: 2024-07-10 | End: 2024-07-10

## 2024-07-10 RX ORDER — LIDOCAINE HYDROCHLORIDE 10 MG/ML
INJECTION, SOLUTION EPIDURAL; INFILTRATION; INTRACAUDAL; PERINEURAL
Status: DISCONTINUED | OUTPATIENT
Start: 2024-07-10 | End: 2024-07-10 | Stop reason: HOSPADM

## 2024-07-10 RX ORDER — ONDANSETRON HYDROCHLORIDE 2 MG/ML
4 INJECTION, SOLUTION INTRAVENOUS DAILY PRN
OUTPATIENT
Start: 2024-07-10

## 2024-07-10 RX ORDER — GLUCAGON 1 MG
1 KIT INJECTION
OUTPATIENT
Start: 2024-07-10

## 2024-07-10 RX ORDER — SODIUM CHLORIDE, SODIUM GLUCONATE, SODIUM ACETATE, POTASSIUM CHLORIDE AND MAGNESIUM CHLORIDE 30; 37; 368; 526; 502 MG/100ML; MG/100ML; MG/100ML; MG/100ML; MG/100ML
INJECTION, SOLUTION INTRAVENOUS CONTINUOUS
OUTPATIENT
Start: 2024-07-10 | End: 2024-08-09

## 2024-07-10 RX ORDER — LIDOCAINE HYDROCHLORIDE 10 MG/ML
INJECTION INFILTRATION; PERINEURAL
Status: DISCONTINUED
Start: 2024-07-10 | End: 2024-07-10 | Stop reason: HOSPADM

## 2024-07-10 RX ORDER — BUPIVACAINE HYDROCHLORIDE 2.5 MG/ML
INJECTION, SOLUTION EPIDURAL; INFILTRATION; INTRACAUDAL
Status: DISCONTINUED | OUTPATIENT
Start: 2024-07-10 | End: 2024-07-10 | Stop reason: HOSPADM

## 2024-07-10 RX ORDER — DIPHENHYDRAMINE HYDROCHLORIDE 50 MG/ML
25 INJECTION INTRAMUSCULAR; INTRAVENOUS EVERY 6 HOURS PRN
OUTPATIENT
Start: 2024-07-10

## 2024-07-10 RX ORDER — TRIAMCINOLONE ACETONIDE 40 MG/ML
INJECTION, SUSPENSION INTRA-ARTICULAR; INTRAMUSCULAR
Status: DISCONTINUED
Start: 2024-07-10 | End: 2024-07-10 | Stop reason: HOSPADM

## 2024-07-10 RX ORDER — PROPOFOL 10 MG/ML
VIAL (ML) INTRAVENOUS
Status: DISCONTINUED | OUTPATIENT
Start: 2024-07-10 | End: 2024-07-10

## 2024-07-10 RX ORDER — BUPIVACAINE HYDROCHLORIDE 2.5 MG/ML
INJECTION, SOLUTION EPIDURAL; INFILTRATION; INTRACAUDAL
Status: DISCONTINUED
Start: 2024-07-10 | End: 2024-07-10 | Stop reason: HOSPADM

## 2024-07-10 RX ORDER — TRIAMCINOLONE ACETONIDE 40 MG/ML
INJECTION, SUSPENSION INTRA-ARTICULAR; INTRAMUSCULAR
Status: DISCONTINUED | OUTPATIENT
Start: 2024-07-10 | End: 2024-07-10 | Stop reason: HOSPADM

## 2024-07-10 RX ADMIN — PROPOFOL 50 MG: 10 INJECTION, EMULSION INTRAVENOUS at 08:07

## 2024-07-10 RX ADMIN — SODIUM CHLORIDE, SODIUM GLUCONATE, SODIUM ACETATE, POTASSIUM CHLORIDE AND MAGNESIUM CHLORIDE: 526; 502; 368; 37; 30 INJECTION, SOLUTION INTRAVENOUS at 08:07

## 2024-07-10 RX ADMIN — PROPOFOL 30 MG: 10 INJECTION, EMULSION INTRAVENOUS at 08:07

## 2024-07-10 RX ADMIN — ACETAMINOPHEN 325MG 650 MG: 325 TABLET ORAL at 09:07

## 2024-07-10 NOTE — OP NOTE
Right C4-6 medial branch radiofrequency ablation    Pre-Procedure Diagnoses:  1. Chronic pain syndrome  2. Cervicalgia  3. Cervical facet arthropathy    Post-Procedure Diagnoses:  1. Chronic pain syndrome  2. Cervicalgia  3. Cervical facet arthropathy    Anesthesia:  Local and MAC    Estimated Blood Loss:  None    Complications:  None    Informed Consent:  The procedure, risks, benefits, and alternatives were discussed with the patient. There were no contraindications to the procedure. The patient expressed understanding and agreed to proceed. Fully informed written consent was obtained.     Description of the Procedure:  The patient was taken to the operating room. IV access was obtained prior to the start of the procedure. The patient was positioned prone on the fluoroscopy table. Continuous hemodynamic monitoring was initiated and continued throughout the duration of the procedure. The skin overlying the cervicothoracic spine was prepped with Chloraprep and draped into a sterile field. Fluoroscopy was used to identify the locations of the right side C4, C5, and C6, medial branch nerves. Skin anesthesia was achieved using 0.5 mL of 1% lidocaine over each injection site. A 20-gauge 100-mm radiofrequency needle was slowly inserted and advanced under intermittent fluoroscopic guidance until it made bony contact at the target sites. Proper needle position was confirmed under AP, oblique, and lateral fluoroscopic views. Negative aspiration for blood or CSF was confirmed. Then a radiofrequency probe was inserted through each needle. The impedence values were low.  Motor testing was performed which confirmed no stimulation of the upper extremity.  Radiofrequency lesioning was then carried out at 80 degrees Celsius for 90 seconds at each level.  Then a combination of 10 mg of Kenalog and 0.5 mL of 0.25% bupivacaine was injected at each level.  There was no pain on injection. The needles were removed intact and bleeding  was nil. Sterile bandages were applied. The patient was taken to the recovery room for further observation in stable condition. The patient was then discharged home without any complications.

## 2024-07-10 NOTE — DISCHARGE SUMMARY
[facility] - Periop Services  Discharge Note  Short Stay    Procedure(s) (LRB):  Radiofrequency Ablation (Right)      OUTCOME: Patient tolerated treatment/procedure well without complication and is now ready for discharge.    DISPOSITION: Home or Self Care    FINAL DIAGNOSIS:  <principal problem not specified>    FOLLOWUP: In clinic    DISCHARGE INSTRUCTIONS:  No discharge procedures on file.      Clinical Reference Documents Added to Patient Instructions         Document    RADIOFREQUENCY ABLATION FOR PAIN (ENGLISH)            TIME SPENT ON DISCHARGE: 5 minutes

## 2024-07-10 NOTE — ANESTHESIA PREPROCEDURE EVALUATION
07/10/2024  Justin Del Cid is a 73 y.o., male with chronic pain and history of multiple recent pain injection procedures.  He is here today for RFA for cervical radiculitis on the right.  He recently had left side done and he did well.      Pre-op Assessment    I have reviewed the Patient Summary Reports.     I have reviewed the Nursing Notes. I have reviewed the NPO Status.   I have reviewed the Medications.     Review of Systems  Anesthesia Hx:  No problems with previous Anesthesia                Hematology/Oncology:  Hematology Normal   Oncology Normal                                   EENT/Dental:  EENT/Dental Normal           Cardiovascular:  Exercise tolerance: good   Hypertension   CAD  asymptomatic              Functional Capacity good / => 4 METS                         Pulmonary:   COPD                     Renal/:   Denies Chronic Renal Disease.                Hepatic/GI:     GERD             Musculoskeletal:  Arthritis               Neurological:  Neurology Normal                                      Endocrine:  Endocrine Normal          Denies Morbid Obesity / BMI > 40  Dermatological:  Skin Normal    Psych:  Psychiatric Normal                    Physical Exam  General: Alert, Oriented, Well nourished and Cooperative    Airway:  Mallampati: II   Mouth Opening: Normal  TM Distance: Normal  Tongue: Normal  Neck ROM: Normal ROM    Dental:  Intact    Chest/Lungs:  Clear to auscultation, Normal Respiratory Rate    Heart:  Rate: Normal  Rhythm: Regular Rhythm        Anesthesia Plan  Type of Anesthesia, risks & benefits discussed:    Anesthesia Type: MAC, Gen Natural Airway  Intra-op Monitoring Plan: Standard ASA Monitors  Post Op Pain Control Plan: multimodal analgesia  Induction:  IV  Airway Plan: Direct  Informed Consent: Informed consent signed with the Patient and all parties understand the risks  and agree with anesthesia plan.  All questions answered. Patient consented to blood products? Yes  ASA Score: 3  Day of Surgery Review of History & Physical: H&P Update referred to the surgeon/provider.I have interviewed and examined the patient. I have reviewed the patient's H&P dated: There are no significant changes.     Ready For Surgery From Anesthesia Perspective.     .

## 2024-07-10 NOTE — TRANSFER OF CARE
"Anesthesia Transfer of Care Note    Patient: Justin Del Cid    Procedure(s) Performed: Procedure(s) (LRB):  Radiofrequency Ablation (Right)    Patient location: OPS    Anesthesia Type: general    Transport from OR: Transported from OR on room air with adequate spontaneous ventilation    Post pain: adequate analgesia    Post assessment: no apparent anesthetic complications    Post vital signs: stable    Level of consciousness: awake and sedated    Nausea/Vomiting: no nausea/vomiting    Complications: none    Transfer of care protocol was followed      Last vitals: Visit Vitals  /76 (BP Location: Right arm, Patient Position: Lying)   Pulse 73   Temp 36.5 °C (97.7 °F) (Temporal)   Resp 18   Ht 5' 7" (1.702 m)   Wt 86.8 kg (191 lb 5.8 oz)   SpO2 99%   BMI 29.97 kg/m²     "

## 2024-07-22 NOTE — ANESTHESIA POSTPROCEDURE EVALUATION
Anesthesia Post Evaluation    Patient: Justin Del Cid    Procedure(s) Performed: Procedure(s) (LRB):  Radiofrequency Ablation (Right)    Final Anesthesia Type: general      Patient location during evaluation: PACU  Patient participation: Yes- Able to Participate  Level of consciousness: awake and alert  Post-procedure vital signs: reviewed and stable  Pain management: adequate  Airway patency: patent      Anesthetic complications: no      Cardiovascular status: blood pressure returned to baseline  Respiratory status: unassisted  Hydration status: euvolemic  Follow-up not needed.              Vitals Value Taken Time   /68 07/10/24 0900   Temp 36.3 °C (97.4 °F) 07/10/24 0830   Pulse 65 07/10/24 0900   Resp 18 07/10/24 0900   SpO2 96 % 07/10/24 0900         No case tracking events are documented in the log.      Pain/Belkis Score: No data recorded

## 2024-07-24 ENCOUNTER — OFFICE VISIT (OUTPATIENT)
Dept: PAIN MEDICINE | Facility: CLINIC | Age: 74
End: 2024-07-24
Payer: OTHER GOVERNMENT

## 2024-07-24 ENCOUNTER — TELEPHONE (OUTPATIENT)
Dept: PAIN MEDICINE | Facility: CLINIC | Age: 74
End: 2024-07-24

## 2024-07-24 VITALS
HEIGHT: 67 IN | WEIGHT: 200 LBS | DIASTOLIC BLOOD PRESSURE: 77 MMHG | BODY MASS INDEX: 31.39 KG/M2 | SYSTOLIC BLOOD PRESSURE: 122 MMHG | HEART RATE: 82 BPM

## 2024-07-24 DIAGNOSIS — M54.12 CERVICAL RADICULOPATHY: ICD-10-CM

## 2024-07-24 DIAGNOSIS — M47.812 ARTHROPATHY OF CERVICAL FACET JOINT: Primary | ICD-10-CM

## 2024-07-24 PROCEDURE — 99214 OFFICE O/P EST MOD 30 MIN: CPT | Mod: ,,, | Performed by: NURSE PRACTITIONER

## 2024-07-24 NOTE — PROGRESS NOTES
Pain Management Clinic    Subjective:     Chief Complaint: Follow-up (F/u 2nd RFA 7/17/24 C/O pain level 1, states procedure helped, states feels warmness every once in awhile at injection site, Taking Aleve for pain .)    Referred by: Mayra Wilburn He*     History of Present Illness: Justin Del Cid is a 74 y.o. male presents today as a follow-up for pain associated with cervical facet arthropathy after completing a right RFA C4-C6 on 07/10/2024 since this last radiofrequency ablation to his cervical region, he has notable pain relief almost 90%.  He iced as he was instructed to do postoperatively 20 minutes on and 20 minutes off several times.  Since completing these cervical radiofrequency ablation this is tremendously helped his sleep as it is not waking him up at night.  He can also drive for longer periods of time without having neck pain.  Overall his pain relief is excellent in the neck region.  Prior to this visit he completed an MRI lumbar spine at an outside hospital.  No one has gone over the plan of care with him or the results.  He would like for us to evaluate his low back where his primary pain is that will radiate bilaterally and to his bilateral hips and travels to bilateral lower extremities in a posterior pattern to his feet.  His pain will elevate to a 10/10 if he walks up he will in his standing and tries to bend over.  His pain will reduce very slightly when he takes Tylenol and uses ice or Aleve.  [facility]SON with patient's request.  If this is approved patient will be scheduled to be evaluated for this.  This information was relayed to the patient.  He agreed with the plan of care    Addendum 07/24/2024:  Received current MRI lumbar spine 05/2024   IMPRESSION:   One.  Mild L1-L2 posterior annular bulging.    2.  Mild L2-L3 posterior annular bulging with a very small right lateral recess disc protrusion and a small left lateral recess/foraminal  "disc protrusion.  3.  L3-L4 very small right lateral recess/foraminal and small left lateral recess/foraminal disc protrusions.    Four.  Mild L4-L5 posterior annular bulging with a small left foraminal disc protrusion.    Five.  Mild L5-S1 posterior annular bulging with a small central/paracentral disc protrusion.    6.  Fusiform infrarenal abdominal aortic aneurysm measuring 3.3 cm.    Vital signs:   Visit Vitals  /77 (BP Location: Right arm, Patient Position: Sitting, BP Method: Medium (Automatic))   Pulse 82   Ht 5' 7" (1.702 m)   Wt 90.7 kg (200 lb)   BMI 31.32 kg/m²      Vitals:    07/24/24 1059   PainSc:   1     Pain Disability Index (PDI): 7       Interventional Pain History  07/17/2024:  Right RFA C4-C6  05/01/2024: Left RFA C4-C6  04/17/2024:  Bilateral MBB C4-C6  04/01/2024: Bilateral MBB C4-C6  10/11/2023: KARLY C7-T1     ROS: Neck and bilateral trapezius pain        MRI cervical spine:   Please see Dr. Alessandro Magana's referrral summary for details         Objective:        Physical Exam      General: Well developed; normal weight. ; A&O x 3; No anxiety/depression; NAD  Mental Status: Oriented to person, palce and time. Displays appropriate mood & affect.  Head: Norm cephalic and atraumatic  Neck:  + bilateral cervical paraspinal banding. Full ROM neck turning to the right some discomfort remains on the right cervical axial spine.Full range of motion with lateral turning to right and with cervical flexion extension.able to raise both arms over head without pain   Eyes: normal conjunctiva, normal lids, normal pupils  ENT and mouth: normal external ear, nose, and no lesions noted on the lips.  Respiratory: Symmetrical, Unlabored. No dyspnea  CV: normal rhythm and rate. No peripheral edema.   Abdomen: Non-distended     Extremities:  Gen: No cyanosis or tenderness to palpation bilateral upper and lower extremities  Skin: Warm, pink, dry, no rashes, no lesions on the lumbar spine  Strength: 5/5 motor " strength bilateral upper and lower extremities  ROM: Full ROM in bilateral knees  without pain or instability.     Neuro:  Gait: no altalgic lean, normal toe and heel raise. Independent ambulator.  DTR's: 2+ in bilateral patellar, and ankle  Sensory: Intact to light touch bilateral  upper and lower extremities     Spine: Normal lordosis. No scoliosis  L-spine ROM:  Limited and painful ROM to flexion, extension, bilateral rotation,   Straight Leg Raise positive bilaterally  SI Joint: No tenderness to palpation bilaterally.  Assessment:      Justin Del Cid is a 74 y.o. male presents today as a follow-up for pain associated with cervical facet arthropathy after completing a right RFA C4-C6 on 07/10/2024 since this last radiofrequency ablation to his cervical region, he has notable pain relief almost 90%.  He iced as he was instructed to do postoperatively 20 minutes on and 20 minutes off several times.  Since completing these cervical radiofrequency ablation this is tremendously helped his sleep as it is not waking him up at night.  He can also drive for longer periods of time without having neck pain.  Overall his pain relief is excellent in the neck region.  Prior to this visit he completed an MRI lumbar spine at an outside hospital.  No one has gone over the plan of care with him or the results.  He would like for us to evaluate his low back where his primary pain is that will radiate bilaterally and to his bilateral hips and travels to bilateral lower extremities in a posterior pattern to his feet.  His pain will elevate to a 10/10 if he walks up he will in his standing and tries to bend over.  His pain will reduce very slightly when he takes Tylenol and uses ice or Aleve.  The [facility] with [facility] BRONSON with patient's request.  If this is approved patient will be scheduled to be evaluated for this.  This information was relayed to the patient.  He agreed with the plan of care    Plan of care:    Follow up in 3 weeks to go over results of lumbar MRI and plan of care.   [place] [facility] [place]          Encounter Diagnoses   Name Primary?    Arthropathy of cervical facet joint Yes    Cervical radiculopathy          Plan:       Justin was seen today for follow-up.    Diagnoses and all orders for this visit:    Arthropathy of cervical facet joint    Cervical radiculopathy

## 2024-10-03 DIAGNOSIS — M51.369 DEGENERATION, INTERVERTEBRAL DISC, LUMBAR: Primary | ICD-10-CM

## 2024-10-17 ENCOUNTER — DOCUMENT SCAN (OUTPATIENT)
Facility: CLINIC | Age: 74
End: 2024-10-17
Payer: OTHER GOVERNMENT

## 2024-11-06 ENCOUNTER — OFFICE VISIT (OUTPATIENT)
Facility: CLINIC | Age: 74
End: 2024-11-06
Payer: OTHER GOVERNMENT

## 2024-11-06 VITALS
DIASTOLIC BLOOD PRESSURE: 73 MMHG | WEIGHT: 190 LBS | BODY MASS INDEX: 29.82 KG/M2 | HEART RATE: 82 BPM | SYSTOLIC BLOOD PRESSURE: 136 MMHG | HEIGHT: 67 IN

## 2024-11-06 DIAGNOSIS — M54.9 CHRONIC BACK PAIN GREATER THAN 3 MONTHS DURATION: ICD-10-CM

## 2024-11-06 DIAGNOSIS — M51.369 DEGENERATION, INTERVERTEBRAL DISC, LUMBAR: ICD-10-CM

## 2024-11-06 DIAGNOSIS — G89.29 CHRONIC BACK PAIN GREATER THAN 3 MONTHS DURATION: ICD-10-CM

## 2024-11-06 DIAGNOSIS — M51.26 PROTRUSION OF LUMBAR INTERVERTEBRAL DISC: ICD-10-CM

## 2024-11-06 DIAGNOSIS — M47.816 LUMBAR FACET ARTHROPATHY: Primary | ICD-10-CM

## 2024-11-06 PROCEDURE — 99214 OFFICE O/P EST MOD 30 MIN: CPT | Mod: ,,, | Performed by: NURSE PRACTITIONER

## 2024-11-06 RX ORDER — CLOPIDOGREL BISULFATE 75 MG/1
TABLET ORAL
COMMUNITY

## 2024-11-06 RX ORDER — FLUTICASONE FUROATE, UMECLIDINIUM BROMIDE AND VILANTEROL TRIFENATATE 100; 62.5; 25 UG/1; UG/1; UG/1
1 POWDER RESPIRATORY (INHALATION)
COMMUNITY

## 2024-11-06 RX ORDER — LIDOCAINE 50 MG/G
1 PATCH TOPICAL DAILY
Qty: 30 PATCH | Refills: 3 | Status: SHIPPED | OUTPATIENT
Start: 2024-11-06 | End: 2024-12-06

## 2024-11-06 RX ORDER — ASPIRIN 325 MG
TABLET ORAL
COMMUNITY

## 2024-11-06 RX ORDER — ROSUVASTATIN CALCIUM 20 MG/1
TABLET, COATED ORAL
COMMUNITY

## 2024-11-06 RX ORDER — MECLIZINE HYDROCHLORIDE 25 MG/1
TABLET ORAL
COMMUNITY

## 2024-11-06 NOTE — PROGRESS NOTES
Pain Management Clinic    Subjective:     Chief Complaint: Pain (DR Norman is referring pt back for L2-3 pain referral in  C/O pain level 8, pt states having lower back & rosalva leg pain, Taking Tylenol for pain.)    Referred by: Mayra Wilburn He*     History of Present Illness: Justin Del Cid is a 74 y.o. male presents today as a follow-up visit for cervicalgia after being referred back to our clinic from neurosurgeon Dr. Evangelista with [facility] to evaluate and treat lumbar intervertebral disc degeneration and consider radiofrequency ablation therapy at L2-L3 as he has significant facet disease noted to the MRI lumbar spine at L2-L3 per  report. See MRI L  spine in medial manager.  [place] reports intense pain with washing his dog, leaning forward, driving for prolonged periods of time as well as fishing and standing too long.  He has deep sharp and achy pain to the lumbar axial spine.  These activities will elevate his pain score to a 10/10 on the NRS.  This also impacts his sleep and causes insomnia.  His pain score today is 8/10 on the NRS.  He has a attended 15 visits with 5th physical therapy for the back, , hip and l bilateral posterior leg pain.  [place] relayed his low back pain is worse however his sciatica comes in a close 2nd.  Since last office visit he has also been seen by a new cardiologist and received stents as well as a new prescription of Plavix. Per Dr. Dyer ([facility]).  He does not have a pacemaker, spinal cord stimulator or defibrillator.        Patient was last seen by me in the clinic on 07/24/2024 with the last HPI noting the following:    Justin Del Cid is a 74 y.o. male presents today as a follow-up for pain associated with cervical facet arthropathy after completing a right RFA C4-C6 on 07/10/2024 since this last radiofrequency ablation to his cervical region, he has notable pain relief almost 90%.  He iced as he was instructed to do postoperatively  "20 minutes on and 20 minutes off several times.  Since completing these cervical radiofrequency ablation this is tremendously helped his sleep as it is not waking him up at night.  He can also drive for longer periods of time without having neck pain.  Overall his pain relief is excellent in the neck region.  Prior to this visit he completed an MRI lumbar spine at an outside hospital.  No one has gone over the plan of care with him or the results.  He would like for us to evaluate his low back where his primary pain is that will radiate bilaterally and to his bilateral hips and travels to bilateral lower extremities in a posterior pattern to his feet.  His pain will elevate to a 10/10 if he walks up he will in his standing and tries to bend over.  His pain will reduce very slightly when he takes Tylenol and uses ice or Aleve.  The [facility] with a BRONSON with patient's request.  If this is approved patient will be scheduled to be evaluated for this.  This information was relayed to the patient.  He agreed with the plan of care     Vital signs:   Visit Vitals  /73 (BP Location: Right arm, Patient Position: Sitting)   Pulse 82   Ht 5' 7" (1.702 m)   Wt 86.2 kg (190 lb)   BMI 29.76 kg/m²      Vitals:    11/06/24 1019   PainSc:   8     Pain Disability Index (PDI): 56       Interventional Pain History  07/17/2024:  Right RFA C4-C6  05/01/2024: Left RFA C4-C6  04/17/2024:  Bilateral MBB C4-C6  04/01/2024: Bilateral MBB C4-C6  10/11/2023: KARLY C7-T1    ROS: Neck pain +bilateral trapezius pain       MRI lumbar spine 05/2024   (located in  per Dr. Alessandro Murray is a referral)  IMPRESSION:   One.  Mild L1-L2 posterior annular bulging.    2.   L2-L3 : Significant facet disease L 2-L3.  Mild Posterior annular bulging with a very small right lateral recess disc protrusion and a small left lateral recess/foraminal disc protrusion.  Significant facet disease  3.  L3-L4 very small right " lateral recess/foraminal and small left lateral recess/foraminal disc protrusions.    Four.  Mild L4-L5 posterior annular bulging with a small left foraminal disc protrusion.    Five.  Mild L5-S1 posterior annular bulging with a small central/paracentral disc protrusion.    6.  Fusiform infrarenal abdominal aortic aneurysm measuring 3.3 cm.       Objective:        Physical Exam  General: Well developed; normal weight. ; A&O x 3; No anxiety/depression; NAD  Mental Status: Oriented to person, palce and time. Displays appropriate mood & affect.  Head: Norm cephalic and atraumatic  Neck:  + bilateral cervical paraspinal banding. Full ROM neck turning to the right some discomfort remains on the right cervical axial spine.Full range of motion with lateral turning to right and with cervical flexion extension.able to raise both arms over head without pain   Eyes: normal conjunctiva, normal lids, normal pupils  ENT and mouth: normal external ear, nose, and no lesions noted on the lips.  Respiratory: Symmetrical, Unlabored. No dyspnea  CV: normal rhythm and rate. No peripheral edema.   Abdomen: Non-distended     Extremities:  Gen: No cyanosis or tenderness to palpation bilateral upper and lower extremities  Skin: Warm, pink, dry, no rashes, no lesions on the lumbar spine  Strength: 5/5 motor strength bilateral upper and lower extremities  ROM: Full ROM in bilateral knees  without pain or instability.     Neuro:  Gait: no altalgic lean, normal toe and heel raise. Independent ambulator.  DTR's: 2+ in bilateral patellar, and ankle  Sensory: Intact to light touch bilateral  upper and lower extremities     Spine: Normal lordosis. No scoliosis  L-spine ROM:  Limited and painful ROM to flexion, extension, bilateral rotation,   Straight Leg Raise positive bilaterally  SI Joint: No tenderness to palpation bilaterally.      Assessment:    Justin Del Cid is a 74 y.o. male presents today as a follow-up visit for cervicalgia after being  referred back to our clinic from [facility] to evaluate and treat lumbar intervertebral disc degeneration and consider radiofrequency ablation therapy at L2-L3 as he has significant facet disease noted to the MRI lumbar spine at L2-L3 per  report. See MRI L  spine in medial manager.   reports intense pain with washing his dog, leaning forward, driving for prolonged periods of time as well as fishing and standing too long.  He has deep sharp and achy pain to the lumbar axial spine.  These activities will elevate his pain score to a 10/10 on the NRS.  This also impacts his sleep and causes insomnia.  His pain score today is 8/10 on the NRS.  He has a attended 15 visits with 5th physical therapy for the back, , hip and l bilateral posterior leg pain.  [place] relayed his low back pain is worse however his sciatica comes in a close 2nd.  Since last office visit he has also been seen by a new cardiologist and received stents as well as a new prescription of Plavix. Per Dr. Dyer ([facility]).  He does not have a pacemaker, spinal cord stimulator or defibrillator.    Plan of care:  Lidoderm patch 5% 12 hours on /12 hours off low back  Request sent for bilateral MBBs  L2-L3 per request of Dr Norman ([facility])  Request sent to staff to reach out to Cardiologist Bety with [facility] in [place] to hold Plavix with history of new stent placements  Encounter Diagnoses   Name Primary?    Protrusion of lumbar intervertebral disc     Chronic back pain greater than 3 months duration     Lumbar facet arthropathy Yes    Degeneration, intervertebral disc, lumbar          Plan:       Justin was seen today for pain.    Diagnoses and all orders for this visit:    Lumbar facet arthropathy  -     LIDOcaine (LIDODERM) 5 %; Place 1 patch onto the skin once daily. Remove & Discard patch within 12 hours or as directed by MD    Protrusion of lumbar intervertebral  disc  -     LIDOcaine (LIDODERM) 5 %; Place 1 patch onto the skin once daily. Remove & Discard patch within 12 hours or as directed by MD    Chronic back pain greater than 3 months duration  -     LIDOcaine (LIDODERM) 5 %; Place 1 patch onto the skin once daily. Remove & Discard patch within 12 hours or as directed by MD    Degeneration, intervertebral disc, lumbar  -     Ambulatory referral/consult to Pain Clinic           Past Medical History:   Diagnosis Date    Anxiety     COPD (chronic obstructive pulmonary disease)     Coronary artery disease 2004    ANGIOPLASTY    GERD (gastroesophageal reflux disease)     Hyperlipidemia     Hypertension     Neck pain     Obesity, unspecified     BMI  31.32    Prostate enlargement     Sinusitis     Unspecified glaucoma        Past Surgical History:   Procedure Laterality Date    ABDOMINAL SURGERY      CARDIAC CATHETERIZATION      ANGIOPLASTY    CATARACT EXTRACTION W/  INTRAOCULAR LENS IMPLANT Bilateral     COLONOSCOPY      EPIDURAL STEROID INJECTION INTO CERVICAL SPINE N/A 10/11/2023    Procedure: Injection-steroid-epidural-cervical;  Surgeon: Nicky Blair MD;  Location: [facility] OR;  Service: Pain Management;  Laterality: N/A;  C7 -T1    HERNIA REPAIR      INJECTION OF ANESTHETIC AGENT AROUND MEDIAL BRANCH NERVES INNERVATING CERVICAL FACET JOINT Bilateral 04/01/2024    Procedure: BLOCK, NERVE, FACET JOINT, CERVICAL, MEDIAL BRANCH  Bilateral c4 c6;  Surgeon: Nicky Blair MD;  Location: [facility] OR;  Service: Pain Management;  Laterality: Bilateral;  Bilateral MBB C4-C6    INJECTION OF ANESTHETIC AGENT AROUND MEDIAL BRANCH NERVES INNERVATING CERVICAL FACET JOINT Bilateral 04/17/2024    Procedure: Block-nerve-medial branch-cervical;  Surgeon: Nicky Blair MD;  Location: [facility] OR;  Service: Pain Management;  Laterality: Bilateral;  Bilateral MBB C4-C6 ([facility])    RADIOFREQUENCY ABLATION Left 05/01/2024    Procedure: Radiofrequency Ablation;  Surgeon: Nicky Blair MD;   Location: [facility] OR;  Service: Pain Management;  Laterality: Left;  RFA Lt C4-C6 ([facility])    RADIOFREQUENCY ABLATION Right 07/10/2024    Procedure: Radiofrequency Ablation;  Surgeon: Nicky Blair MD;  Location: [facility] OR;  Service: Pain Management;  Laterality: Right;  Right RFA C4-C6 ([facility])    STENT PLACEMENT/PRIOR TO YOSELIN      3x heart       Family History   Problem Relation Name Age of Onset    No Known Problems Mother      Cancer Father      COPD Sister         Social History     Socioeconomic History    Marital status:    Tobacco Use    Smoking status: Every Day     Current packs/day: 1.00     Average packs/day: 1 pack/day for 49.8 years (49.8 ttl pk-yrs)     Types: Cigarettes     Start date: 1975    Smokeless tobacco: Never   Substance and Sexual Activity    Alcohol use: Not Currently    Drug use: Never    Sexual activity: Yes       Current Outpatient Medications   Medication Sig Dispense Refill    acetaminophen (TYLENOL) 500 MG tablet Take 500 mg by mouth every 6 (six) hours as needed for Pain.      albuterol (PROVENTIL) 2.5 mg /3 mL (0.083 %) nebulizer solution Take 2.5 mg by nebulization every 6 (six) hours as needed for Wheezing. Rescue      ALBUTEROL INHL Inhale 2 puffs into the lungs every 4 (four) hours as needed.      amLODIPine (NORVASC) 10 MG tablet Take 10 mg by mouth once daily.      amlodipine-benazepril 10-20mg (LOTREL) 10-20 mg per capsule Take 1 capsule by mouth once daily.      aspirin 325 MG tablet 1 tablet Orally Once a day      cetirizine (ZYRTEC) 10 MG tablet Take 10 mg by mouth once daily.      clopidogreL (PLAVIX) 75 mg tablet 1 tablet Orally Once a day for 30 day(s)      EScitalopram oxalate (LEXAPRO) 20 MG tablet Take 20 mg by mouth as needed.      fluticasone-salmeterol diskus inhaler 100-50 mcg Inhale 1 puff into the lungs 2 (two) times daily as needed. Controller      fluticasone-umeclidin-vilanter (TRELEGY ELLIPTA) 100-62.5-25 mcg DsDv 1 puff.      losartan (COZAAR) 100 MG tablet  Take 100 mg by mouth once daily.      meclizine (ANTIVERT) 25 mg tablet       metoprolol succinate (TOPROL-XL) 25 MG 24 hr tablet Take 25 mg by mouth once daily.      naproxen sodium (ANAPROX) 220 MG tablet Take 220 mg by mouth 2 (two) times daily with meals.      omeprazole (PRILOSEC) 40 MG capsule Take 40 mg by mouth once daily.      rosuvastatin (CRESTOR) 20 MG tablet 1 tablet Orally Once a day      sildenafiL (VIAGRA) 100 MG tablet 50 mg.      tamsulosin (FLOMAX) 0.4 mg Cap Take 0.4 mg by mouth nightly.      triamcinolone acetonide 0.1% (KENALOG) 0.1 % cream APPLY SMALL AMOUNT TO THE SKIN TWICE A DAY AS DIRECTED TO NECK FOR 2 WEEKS      vit A/vit C/vit E/zinc/copper (PRESERVISION AREDS ORAL) Take by mouth.      LIDOcaine (LIDODERM) 5 % Place 1 patch onto the skin once daily. Remove & Discard patch within 12 hours or as directed by MD 30 patch 3     No current facility-administered medications for this visit.       Review of patient's allergies indicates:   Allergen Reactions    Gabapentin      Bad emotional side effects.    Iodine     Shrimp Rash

## 2024-11-06 NOTE — LETTER
Cardiac Risk Assessment Request Form      Date: 11/07/2024    To: Chris Albert MD     Patient's Name: Justin Del Cid     YOB: 1950    Patient is scheduled to have bilateral transforaminal epidural steroid injection L2-L3  on pending clearance  by Dr. Blair with (check one):    XX  Local/regional    Dx: lumbar facet spondylosis        Requesting staff name: Analy      Phone number: [phone]        Fax number: [phone]      Check all that apply and complete blanks:      XXX Request to hold __plavix __ for _7_ days prior to procedure     Please fax document back to [phone] or [phone]  ATTN: [facility] ([facility]) and allow at lease 3 business days to receive a response from [facility].  According to [employer] College [employer] cardiac risk assessment, low risk surgeries do not need cardiac testing or risk stratification. Patients that are asymptomatic should safely proceed with low risk procedures that may include, but are not limited to dental procedure, minor skin procedures, EGD, Colonoscopy or Cataracts.  Symptomatic patients should make an appointment with [facility].

## 2025-04-14 ENCOUNTER — OFFICE VISIT (OUTPATIENT)
Facility: CLINIC | Age: 75
End: 2025-04-14
Payer: OTHER GOVERNMENT

## 2025-04-14 VITALS
DIASTOLIC BLOOD PRESSURE: 79 MMHG | WEIGHT: 173 LBS | HEART RATE: 83 BPM | HEIGHT: 67 IN | SYSTOLIC BLOOD PRESSURE: 131 MMHG | BODY MASS INDEX: 27.15 KG/M2

## 2025-04-14 DIAGNOSIS — M47.816 LUMBAR SPONDYLOSIS: ICD-10-CM

## 2025-04-14 DIAGNOSIS — G89.29 CHRONIC BACK PAIN GREATER THAN 3 MONTHS DURATION: ICD-10-CM

## 2025-04-14 DIAGNOSIS — M54.2 CERVICALGIA: ICD-10-CM

## 2025-04-14 DIAGNOSIS — M54.9 CHRONIC BACK PAIN GREATER THAN 3 MONTHS DURATION: ICD-10-CM

## 2025-04-14 DIAGNOSIS — M54.12 CERVICAL RADICULITIS: ICD-10-CM

## 2025-04-14 DIAGNOSIS — M50.30 DDD (DEGENERATIVE DISC DISEASE), CERVICAL: Primary | ICD-10-CM

## 2025-04-14 PROCEDURE — 99213 OFFICE O/P EST LOW 20 MIN: CPT | Mod: ,,, | Performed by: ANESTHESIOLOGY

## 2025-04-14 NOTE — LETTER
Cardiac Risk Assessment Request Form      Date: 4/14/2025      Patient's Name: Justin Del Cid      YOB: 1950    Patient is scheduled to have bilateral medial branch blocks at L1-2  on 5/07/2025 by Dr. Nicky Blair  with (check one):    XX  Local/regional    Dx: lumbar facet spondylosis  (please no ICD-10 code)      Requesting staff name: Analy       Phone number: [phone]      Fax number: [phone]      Check all that apply and complete blanks:    XX  Request for cardiac risk assessment for procedure    XX Request to hold _Plavix & ASA ___ for __7__ days prior to procedure      Please fax document back Rachel Albert or [phone]  ATTN: [facility] ([facility]) and allow at lease 3 business days to receive a response from [facility].  According to American College [employer] cardiac risk assessment, low risk surgeries do not need cardiac testing or risk stratification. Patients that are asymptomatic should safely proceed with low risk procedures that may include, but are not limited to dental procedure, minor skin procedures, EGD, Colonoscopy or Cataracts.  Symptomatic patients should make an appointment with [facility].

## 2025-04-14 NOTE — H&P (VIEW-ONLY)
"  [facility]    Subjective:     Chief Complaint: Mid-back Pain (Mid-back pain that radiates to lower back and bilateral hips.  Pain Scale:  10/10  He uses the heating pad and takes Tylenol, 500 mg three times a day.)    Referred by: Mayra Wilburn He*     History of Present Illness:   Justin Del Cid is a 74 y.o. male who returns to clinic today complaining of persistent low back pain.  He states that his back feels busted.  He is taking Tylenol a few times per day to help manage the pain.  His back pain makes him not want to walk because then the pain gets worse.  We are planning to schedule him for lumbar medial branch blocks at his last appointment here per the request of his neurosurgeon Dr. Gordon, but he had just had cardiac stents placed and was on Plavix and unable to discontinue it for at least 6 months.  He is back today and would like to get that rescheduled now.        Vital signs:   Visit Vitals  /79 (BP Location: Left arm, Patient Position: Sitting)   Pulse 83   Ht 5' 7" (1.702 m)   Wt 78.5 kg (173 lb)   BMI 27.10 kg/m²      Vitals:    04/14/25 1001   PainSc: 10-Worst pain ever     Pain Disability Index (PDI): 55       Interventional Pain History  07/17/2024:  Right RFA C4-C6  05/01/2024: Left RFA C4-C6  04/17/2024:  Bilateral MBB C4-C6  04/01/2024: Bilateral MBB C4-C6  10/11/2023: KARLY C7-T1    Review of Systems   All other systems reviewed and are negative.  : Neck pain +bilateral trapezius pain       MRI lumbar spine 05/2024   (located in  per Dr. Alessandro Murray is a referral)  IMPRESSION:   One.  Mild L1-L2 posterior annular bulging.    2.   L2-L3 : Significant facet disease L 2-L3.  Mild Posterior annular bulging with a very small right lateral recess disc protrusion and a small left lateral recess/foraminal disc protrusion.  Significant facet disease  3.  L3-L4 very small right lateral recess/foraminal and small left lateral recess/foraminal disc protrusions.  "   Four.  Mild L4-L5 posterior annular bulging with a small left foraminal disc protrusion.    Five.  Mild L5-S1 posterior annular bulging with a small central/paracentral disc protrusion.    6.  Fusiform infrarenal abdominal aortic aneurysm measuring 3.3 cm.       Objective:        Physical Exam  General: Well developed; normal weight. ; A&O x 3; No anxiety/depression; NAD  Mental Status: Oriented to person, palce and time. Displays appropriate mood & affect.  Head: Norm cephalic and atraumatic  Neck:  + bilateral cervical paraspinal banding. Full ROM neck turning to the right some discomfort remains on the right cervical axial spine.Full range of motion with lateral turning to right and with cervical flexion extension.able to raise both arms over head without pain   Eyes: normal conjunctiva, normal lids, normal pupils  ENT and mouth: normal external ear, nose, and no lesions noted on the lips.  Respiratory: Symmetrical, Unlabored. No dyspnea  CV: normal rhythm and rate. No peripheral edema.   Abdomen: Non-distended     Extremities:  Gen: No cyanosis or tenderness to palpation bilateral upper and lower extremities  Skin: Warm, pink, dry, no rashes, no lesions on the lumbar spine  Strength: 5/5 motor strength bilateral upper and lower extremities  ROM: Full ROM in bilateral knees  without pain or instability.     Neuro:  Gait: no altalgic lean, normal toe and heel raise. Independent ambulator.  DTR's: 2+ in bilateral patellar, and ankle  Sensory: Intact to light touch bilateral  upper and lower extremities     Spine: Normal lordosis. No scoliosis  L-spine ROM:  Limited and painful ROM to flexion, extension, bilateral rotation,   Straight Leg Raise positive bilaterally  SI Joint: No tenderness to palpation bilaterally.          Assessment:        Encounter Diagnoses   Name Primary?    Cervical radiculitis Yes    DDD (degenerative disc disease), cervical     Cervicalgia                Plan:       Justin was seen today  for mid-back pain.    Diagnoses and all orders for this visit:    Cervical radiculitis    DDD (degenerative disc disease), cervical    Cervicalgia       He is being scheduled today for bilateral L1-2 diagnostic medial branch blocks to treat his facet arthropathy at L2-3.  If he has good but temporary relief from 2 sets of blocks, we will then proceed to radiofrequency ablation.    Past Medical History:   Diagnosis Date    Anxiety     COPD (chronic obstructive pulmonary disease)     Coronary artery disease 2004    ANGIOPLASTY    GERD (gastroesophageal reflux disease)     Hyperlipidemia     Hypertension     Neck pain     Obesity, unspecified     BMI  31.32    Prostate enlargement     Sinusitis     Unspecified glaucoma        Past Surgical History:   Procedure Laterality Date    ABDOMINAL SURGERY      CARDIAC CATHETERIZATION      ANGIOPLASTY    CATARACT EXTRACTION W/  INTRAOCULAR LENS IMPLANT Bilateral     COLONOSCOPY      EPIDURAL STEROID INJECTION INTO CERVICAL SPINE N/A 10/11/2023    Procedure: Injection-steroid-epidural-cervical;  Surgeon: Nicky Blair MD;  Location: [facility] OR;  Service: Pain Management;  Laterality: N/A;  C7 -T1    HERNIA REPAIR      INJECTION OF ANESTHETIC AGENT AROUND MEDIAL BRANCH NERVES INNERVATING CERVICAL FACET JOINT Bilateral 04/01/2024    Procedure: BLOCK, NERVE, FACET JOINT, CERVICAL, MEDIAL BRANCH  Bilateral c4 c6;  Surgeon: Nicky Blair MD;  Location: [facility] OR;  Service: Pain Management;  Laterality: Bilateral;  Bilateral MBB C4-C6    INJECTION OF ANESTHETIC AGENT AROUND MEDIAL BRANCH NERVES INNERVATING CERVICAL FACET JOINT Bilateral 04/17/2024    Procedure: Block-nerve-medial branch-cervical;  Surgeon: Nicky Blair MD;  Location: [facility] OR;  Service: Pain Management;  Laterality: Bilateral;  Bilateral MBB C4-C6 ([facility])    RADIOFREQUENCY ABLATION Left 05/01/2024    Procedure: Radiofrequency Ablation;  Surgeon: Nicky Blair MD;  Location: [facility] OR;  Service: Pain Management;   Laterality: Left;  RFA Lt C4-C6 ([facility])    RADIOFREQUENCY ABLATION Right 07/10/2024    Procedure: Radiofrequency Ablation;  Surgeon: Nicky Blair MD;  Location: [facility] OR;  Service: Pain Management;  Laterality: Right;  Right RFA C4-C6 ([facility])    STENT PLACEMENT/PRIOR TO YOSELIN      3x heart       Family History   Problem Relation Name Age of Onset    No Known Problems Mother      Cancer Father      COPD Sister         Social History     Socioeconomic History    Marital status:    Tobacco Use    Smoking status: Every Day     Current packs/day: 1.00     Average packs/day: 1 pack/day for 50.3 years (50.3 ttl pk-yrs)     Types: Cigarettes     Start date: 1975    Smokeless tobacco: Never   Substance and Sexual Activity    Alcohol use: Not Currently    Drug use: Never    Sexual activity: Yes       Current Outpatient Medications   Medication Sig Dispense Refill    acetaminophen (TYLENOL) 500 MG tablet Take 500 mg by mouth every 6 (six) hours as needed for Pain.      albuterol (PROVENTIL) 2.5 mg /3 mL (0.083 %) nebulizer solution Take 2.5 mg by nebulization every 6 (six) hours as needed for Wheezing. Rescue      ALBUTEROL INHL Inhale 2 puffs into the lungs every 4 (four) hours as needed.      amLODIPine (NORVASC) 10 MG tablet Take 10 mg by mouth once daily.      amlodipine-benazepril 10-20mg (LOTREL) 10-20 mg per capsule Take 1 capsule by mouth once daily.      aspirin 325 MG tablet 1 tablet Orally Once a day      cetirizine (ZYRTEC) 10 MG tablet Take 10 mg by mouth once daily.      clopidogreL (PLAVIX) 75 mg tablet 1 tablet Orally Once a day for 30 day(s)      EScitalopram oxalate (LEXAPRO) 20 MG tablet Take 20 mg by mouth as needed.      fluticasone-salmeterol diskus inhaler 100-50 mcg Inhale 1 puff into the lungs 2 (two) times daily as needed. Controller      fluticasone-umeclidin-vilanter (TRELEGY ELLIPTA) 100-62.5-25 mcg DsDv 1 puff.      losartan (COZAAR) 100 MG tablet Take 100 mg by mouth once daily.       metoprolol succinate (TOPROL-XL) 25 MG 24 hr tablet Take 25 mg by mouth once daily.      naproxen sodium (ANAPROX) 220 MG tablet Take 220 mg by mouth 2 (two) times daily with meals.      omeprazole (PRILOSEC) 40 MG capsule Take 40 mg by mouth once daily.      rosuvastatin (CRESTOR) 20 MG tablet 1 tablet Orally Once a day      sildenafiL (VIAGRA) 100 MG tablet 50 mg.      tamsulosin (FLOMAX) 0.4 mg Cap Take 0.4 mg by mouth nightly.      vit A/vit C/vit E/zinc/copper (PRESERVISION AREDS ORAL) Take by mouth.      meclizine (ANTIVERT) 25 mg tablet  (Patient not taking: Reported on 4/14/2025)      triamcinolone acetonide 0.1% (KENALOG) 0.1 % cream APPLY SMALL AMOUNT TO THE SKIN TWICE A DAY AS DIRECTED TO NECK FOR 2 WEEKS (Patient not taking: Reported on 4/14/2025)       No current facility-administered medications for this visit.       Review of patient's allergies indicates:   Allergen Reactions    Gabapentin      Bad emotional side effects.    Iodine     Shrimp Rash

## 2025-04-14 NOTE — LETTER
To: Dr. Chris Albert     Patient Name: Justin Del Cid  : 1950    Procedure Medial Branch Block bilateral L1-2 on 25.    The above patient is being scheduled for a procedure under fluoroscopy with iodine that will require to discontinue one or more of the following medications:     Current Anticoagulant: This patient does not have an active medication from one of the medication groupers.    []   Coumadin/Warfarin for 5 days   [x]   Aspirin for 7 days      [x]   Plavix for 7 days  []   Xarelto for 2 days   []   Eliquis for 2 days       Please check one:    If patient is on Coumadin, will he/she need Lovenox in the interim?    []  Yes        []  No      Please check one:    []  I will manage the Lovenox prescription before and after the injection.        Approved by: ___________________________  Date: ______________      Denied by: _____________________________  Date: ______________      Interventional Pain Management   Nicky Blair MD    [address] [place] [address], [facility] [address]  [place] [address]  [phone] (Phone)       [phone] (Fax)

## 2025-04-14 NOTE — PROGRESS NOTES
"  Pain Management Clinic    Subjective:     Chief Complaint: Mid-back Pain (Mid-back pain that radiates to lower back and bilateral hips.  Pain Scale:  10/10  He uses the heating pad and takes Tylenol, 500 mg three times a day.)    Referred by: Mayra Wilburn He*     History of Present Illness:   Justin Del Cid is a 74 y.o. male who returns to clinic today complaining of persistent low back pain.  He states that his back feels busted.  He is taking Tylenol a few times per day to help manage the pain.  His back pain makes him not want to walk because then the pain gets worse.  We are planning to schedule him for lumbar medial branch blocks at his last appointment here per the request of his neurosurgeon Dr. Gordon, but he had just had cardiac stents placed and was on Plavix and unable to discontinue it for at least 6 months.  He is back today and would like to get that rescheduled now.        Vital signs:   Visit Vitals  /79 (BP Location: Left arm, Patient Position: Sitting)   Pulse 83   Ht 5' 7" (1.702 m)   Wt 78.5 kg (173 lb)   BMI 27.10 kg/m²      Vitals:    04/14/25 1001   PainSc: 10-Worst pain ever     Pain Disability Index (PDI): 55       Interventional Pain History  07/17/2024:  Right RFA C4-C6  05/01/2024: Left RFA C4-C6  04/17/2024:  Bilateral MBB C4-C6  04/01/2024: Bilateral MBB C4-C6  10/11/2023: KARLY C7-T1    Review of Systems   All other systems reviewed and are negative.  : Neck pain +bilateral trapezius pain       MRI lumbar spine 05/2024   (located in  per Dr. Alessandro Murray is a referral)  IMPRESSION:   One.  Mild L1-L2 posterior annular bulging.    2.   L2-L3 : Significant facet disease L 2-L3.  Mild Posterior annular bulging with a very small right lateral recess disc protrusion and a small left lateral recess/foraminal disc protrusion.  Significant facet disease  3.  L3-L4 very small right lateral recess/foraminal and small left lateral recess/foraminal disc protrusions.  "   Four.  Mild L4-L5 posterior annular bulging with a small left foraminal disc protrusion.    Five.  Mild L5-S1 posterior annular bulging with a small central/paracentral disc protrusion.    6.  Fusiform infrarenal abdominal aortic aneurysm measuring 3.3 cm.       Objective:        Physical Exam  General: Well developed; normal weight. ; A&O x 3; No anxiety/depression; NAD  Mental Status: Oriented to person, palce and time. Displays appropriate mood & affect.  Head: Norm cephalic and atraumatic  Neck:  + bilateral cervical paraspinal banding. Full ROM neck turning to the right some discomfort remains on the right cervical axial spine.Full range of motion with lateral turning to right and with cervical flexion extension.able to raise both arms over head without pain   Eyes: normal conjunctiva, normal lids, normal pupils  ENT and mouth: normal external ear, nose, and no lesions noted on the lips.  Respiratory: Symmetrical, Unlabored. No dyspnea  CV: normal rhythm and rate. No peripheral edema.   Abdomen: Non-distended     Extremities:  Gen: No cyanosis or tenderness to palpation bilateral upper and lower extremities  Skin: Warm, pink, dry, no rashes, no lesions on the lumbar spine  Strength: 5/5 motor strength bilateral upper and lower extremities  ROM: Full ROM in bilateral knees  without pain or instability.     Neuro:  Gait: no altalgic lean, normal toe and heel raise. Independent ambulator.  DTR's: 2+ in bilateral patellar, and ankle  Sensory: Intact to light touch bilateral  upper and lower extremities     Spine: Normal lordosis. No scoliosis  L-spine ROM:  Limited and painful ROM to flexion, extension, bilateral rotation,   Straight Leg Raise positive bilaterally  SI Joint: No tenderness to palpation bilaterally.          Assessment:        Encounter Diagnoses   Name Primary?    Cervical radiculitis Yes    DDD (degenerative disc disease), cervical     Cervicalgia                Plan:       Justin was seen today  for mid-back pain.    Diagnoses and all orders for this visit:    Cervical radiculitis    DDD (degenerative disc disease), cervical    Cervicalgia       He is being scheduled today for bilateral L1-2 diagnostic medial branch blocks to treat his facet arthropathy at L2-3.  If he has good but temporary relief from 2 sets of blocks, we will then proceed to radiofrequency ablation.    Past Medical History:   Diagnosis Date    Anxiety     COPD (chronic obstructive pulmonary disease)     Coronary artery disease 2004    ANGIOPLASTY    GERD (gastroesophageal reflux disease)     Hyperlipidemia     Hypertension     Neck pain     Obesity, unspecified     BMI  31.32    Prostate enlargement     Sinusitis     Unspecified glaucoma        Past Surgical History:   Procedure Laterality Date    ABDOMINAL SURGERY      CARDIAC CATHETERIZATION      ANGIOPLASTY    CATARACT EXTRACTION W/  INTRAOCULAR LENS IMPLANT Bilateral     COLONOSCOPY      EPIDURAL STEROID INJECTION INTO CERVICAL SPINE N/A 10/11/2023    Procedure: Injection-steroid-epidural-cervical;  Surgeon: Nicky Blair MD;  Location: [facility] OR;  Service: Pain Management;  Laterality: N/A;  C7 -T1    HERNIA REPAIR      INJECTION OF ANESTHETIC AGENT AROUND MEDIAL BRANCH NERVES INNERVATING CERVICAL FACET JOINT Bilateral 04/01/2024    Procedure: BLOCK, NERVE, FACET JOINT, CERVICAL, MEDIAL BRANCH  Bilateral c4 c6;  Surgeon: Nicky Blair MD;  Location: [facility] OR;  Service: Pain Management;  Laterality: Bilateral;  Bilateral MBB C4-C6    INJECTION OF ANESTHETIC AGENT AROUND MEDIAL BRANCH NERVES INNERVATING CERVICAL FACET JOINT Bilateral 04/17/2024    Procedure: Block-nerve-medial branch-cervical;  Surgeon: Nicky Blair MD;  Location: [facility] OR;  Service: Pain Management;  Laterality: Bilateral;  Bilateral MBB C4-C6 ([facility])    RADIOFREQUENCY ABLATION Left 05/01/2024    Procedure: Radiofrequency Ablation;  Surgeon: Nicky Blair MD;  Location: [facility] OR;  Service: Pain Management;   Laterality: Left;  RFA Lt C4-C6 ([facility])    RADIOFREQUENCY ABLATION Right 07/10/2024    Procedure: Radiofrequency Ablation;  Surgeon: Nicky Blair MD;  Location: [facility] OR;  Service: Pain Management;  Laterality: Right;  Right RFA C4-C6 ([facility])    STENT PLACEMENT/PRIOR TO YOSELIN      3x heart       Family History   Problem Relation Name Age of Onset    No Known Problems Mother      Cancer Father      COPD Sister         Social History     Socioeconomic History    Marital status:    Tobacco Use    Smoking status: Every Day     Current packs/day: 1.00     Average packs/day: 1 pack/day for 50.3 years (50.3 ttl pk-yrs)     Types: Cigarettes     Start date: 1975    Smokeless tobacco: Never   Substance and Sexual Activity    Alcohol use: Not Currently    Drug use: Never    Sexual activity: Yes       Current Outpatient Medications   Medication Sig Dispense Refill    acetaminophen (TYLENOL) 500 MG tablet Take 500 mg by mouth every 6 (six) hours as needed for Pain.      albuterol (PROVENTIL) 2.5 mg /3 mL (0.083 %) nebulizer solution Take 2.5 mg by nebulization every 6 (six) hours as needed for Wheezing. Rescue      ALBUTEROL INHL Inhale 2 puffs into the lungs every 4 (four) hours as needed.      amLODIPine (NORVASC) 10 MG tablet Take 10 mg by mouth once daily.      amlodipine-benazepril 10-20mg (LOTREL) 10-20 mg per capsule Take 1 capsule by mouth once daily.      aspirin 325 MG tablet 1 tablet Orally Once a day      cetirizine (ZYRTEC) 10 MG tablet Take 10 mg by mouth once daily.      clopidogreL (PLAVIX) 75 mg tablet 1 tablet Orally Once a day for 30 day(s)      EScitalopram oxalate (LEXAPRO) 20 MG tablet Take 20 mg by mouth as needed.      fluticasone-salmeterol diskus inhaler 100-50 mcg Inhale 1 puff into the lungs 2 (two) times daily as needed. Controller      fluticasone-umeclidin-vilanter (TRELEGY ELLIPTA) 100-62.5-25 mcg DsDv 1 puff.      losartan (COZAAR) 100 MG tablet Take 100 mg by mouth once daily.       metoprolol succinate (TOPROL-XL) 25 MG 24 hr tablet Take 25 mg by mouth once daily.      naproxen sodium (ANAPROX) 220 MG tablet Take 220 mg by mouth 2 (two) times daily with meals.      omeprazole (PRILOSEC) 40 MG capsule Take 40 mg by mouth once daily.      rosuvastatin (CRESTOR) 20 MG tablet 1 tablet Orally Once a day      sildenafiL (VIAGRA) 100 MG tablet 50 mg.      tamsulosin (FLOMAX) 0.4 mg Cap Take 0.4 mg by mouth nightly.      vit A/vit C/vit E/zinc/copper (PRESERVISION AREDS ORAL) Take by mouth.      meclizine (ANTIVERT) 25 mg tablet  (Patient not taking: Reported on 4/14/2025)      triamcinolone acetonide 0.1% (KENALOG) 0.1 % cream APPLY SMALL AMOUNT TO THE SKIN TWICE A DAY AS DIRECTED TO NECK FOR 2 WEEKS (Patient not taking: Reported on 4/14/2025)       No current facility-administered medications for this visit.       Review of patient's allergies indicates:   Allergen Reactions    Gabapentin      Bad emotional side effects.    Iodine     Shrimp Rash

## 2025-05-02 DIAGNOSIS — M50.30 OTHER CERVICAL DISC DEGENERATION, UNSPECIFIED CERVICAL REGION: Primary | ICD-10-CM

## 2025-05-07 ENCOUNTER — HOSPITAL ENCOUNTER (OUTPATIENT)
Facility: HOSPITAL | Age: 75
Discharge: HOME | End: 2025-05-07
Attending: ANESTHESIOLOGY | Admitting: ANESTHESIOLOGY
Payer: OTHER GOVERNMENT

## 2025-05-07 DIAGNOSIS — G89.29 CHRONIC BACK PAIN GREATER THAN 3 MONTHS DURATION: Primary | ICD-10-CM

## 2025-05-07 DIAGNOSIS — M54.9 CHRONIC BACK PAIN GREATER THAN 3 MONTHS DURATION: Primary | ICD-10-CM

## 2025-05-07 PROCEDURE — 64493 INJ PARAVERT F JNT L/S 1 LEV: CPT | Mod: 50 | Performed by: ANESTHESIOLOGY

## 2025-05-07 PROCEDURE — 64493 INJ PARAVERT F JNT L/S 1 LEV: CPT | Mod: 50,,, | Performed by: ANESTHESIOLOGY

## 2025-05-07 PROCEDURE — 25000003 PHARM REV CODE 250: Performed by: ANESTHESIOLOGY

## 2025-05-07 PROCEDURE — 63600175 PHARM REV CODE 636 W HCPCS: Performed by: ANESTHESIOLOGY

## 2025-05-07 RX ORDER — DIAZEPAM 5 MG/1
10 TABLET ORAL
Status: DISCONTINUED | OUTPATIENT
Start: 2025-05-07 | End: 2025-05-07 | Stop reason: HOSPADM

## 2025-05-07 RX ORDER — BUPIVACAINE HYDROCHLORIDE 2.5 MG/ML
INJECTION, SOLUTION EPIDURAL; INFILTRATION; INTRACAUDAL; PERINEURAL
Status: DISCONTINUED | OUTPATIENT
Start: 2025-05-07 | End: 2025-05-07 | Stop reason: HOSPADM

## 2025-05-07 RX ORDER — LIDOCAINE HYDROCHLORIDE 10 MG/ML
INJECTION, SOLUTION EPIDURAL; INFILTRATION; INTRACAUDAL; PERINEURAL
Status: DISCONTINUED
Start: 2025-05-07 | End: 2025-05-07 | Stop reason: HOSPADM

## 2025-05-07 RX ORDER — LIDOCAINE HYDROCHLORIDE 10 MG/ML
INJECTION, SOLUTION EPIDURAL; INFILTRATION; INTRACAUDAL; PERINEURAL
Status: DISCONTINUED | OUTPATIENT
Start: 2025-05-07 | End: 2025-05-07 | Stop reason: HOSPADM

## 2025-05-07 RX ORDER — TRIAMCINOLONE ACETONIDE 40 MG/ML
INJECTION, SUSPENSION INTRA-ARTICULAR; INTRAMUSCULAR
Status: DISCONTINUED
Start: 2025-05-07 | End: 2025-05-07 | Stop reason: WASHOUT

## 2025-05-07 RX ORDER — BUPIVACAINE HYDROCHLORIDE 2.5 MG/ML
INJECTION, SOLUTION EPIDURAL; INFILTRATION; INTRACAUDAL; PERINEURAL
Status: DISCONTINUED
Start: 2025-05-07 | End: 2025-05-07 | Stop reason: HOSPADM

## 2025-05-07 RX ADMIN — DIAZEPAM 10 MG: 5 TABLET ORAL at 08:05

## 2025-05-07 NOTE — DISCHARGE SUMMARY
[facility] - Periop Services  Discharge Note  Short Stay    Procedure(s) (LRB):  BLOCK, NERVE, FACET JOINT, LUMBAR, MEDIAL BRANCH /Bilateral MBB L1-2 ([facility]) (Bilateral)      OUTCOME: Patient tolerated treatment/procedure well without complication and is now ready for discharge.    DISPOSITION: Home or Self Care    FINAL DIAGNOSIS:  <principal problem not specified>    FOLLOWUP: In clinic    DISCHARGE INSTRUCTIONS:  No discharge procedures on file.     TIME SPENT ON DISCHARGE: 5 minutes

## 2025-05-07 NOTE — OP NOTE
Bilateral L1-2 diagnostic medial branch blocks    Pre-Procedure Diagnoses:  1. Chronic pain syndrome  2. Chronic Low back pain  3. Lumbar facet arthropathy    Post-Procedure Diagnoses:  1. Chronic pain syndrome  2. Chronic Low back pain  3. Lumbar facet arthropathy    Anesthesia:  Local and MAC    Estimated Blood Loss:  None    Complications:  None    Informed Consent:  The procedure, risks, benefits, and alternatives were discussed with the patient. There were no contraindications to the procedure. The patient expressed understanding and agreed to proceed. Fully informed written consent was obtained.     Description of the Procedure:  The patient was taken to the operating room. IV access was obtained prior to the start of the procedure. The patient was positioned prone on the fluoroscopy table. Continuous hemodynamic monitoring was initiated and continued throughout the duration of the procedure. The skin overlying the lumbosacral spine was prepped with Chloraprep and draped into a sterile field. Fluoroscopy was used to identify the locations of the left side L1 and L2 medial branch nerves at the junctions of the superior articular processes and transverse processes of L2 and L3, respectively. Skin anesthesia was achieved using 0.5 mL of 1% lidocaine over each injection site. A 22-gauge 3.5-inch Quinke spinal needle was slowly inserted and advanced under intermittent fluoroscopic guidance until it made bony contact at the target sites. Proper needle position was confirmed under AP, oblique, and lateral fluoroscopic views. Negative aspiration for blood or CSF was confirmed. Then 0.5 mL of 0.25% bupivacaine was easily injected at each level. There was no pain on injection. The needles were removed intact and bleeding was nil. The same procedure was repeated in identical fashion on the right side. Sterile bandages were applied. The patient was taken to the recovery room for further observation in stable condition.  The patient was then discharged home without any complications.

## 2025-05-08 VITALS
HEIGHT: 67 IN | OXYGEN SATURATION: 100 % | RESPIRATION RATE: 20 BRPM | HEART RATE: 64 BPM | WEIGHT: 181.19 LBS | DIASTOLIC BLOOD PRESSURE: 75 MMHG | TEMPERATURE: 98 F | BODY MASS INDEX: 28.44 KG/M2 | SYSTOLIC BLOOD PRESSURE: 126 MMHG

## 2025-05-15 ENCOUNTER — OFFICE VISIT (OUTPATIENT)
Facility: CLINIC | Age: 75
End: 2025-05-15
Payer: OTHER GOVERNMENT

## 2025-05-15 VITALS
WEIGHT: 182 LBS | DIASTOLIC BLOOD PRESSURE: 75 MMHG | BODY MASS INDEX: 28.56 KG/M2 | HEIGHT: 67 IN | HEART RATE: 74 BPM | SYSTOLIC BLOOD PRESSURE: 129 MMHG

## 2025-05-15 DIAGNOSIS — M54.9 CHRONIC BACK PAIN GREATER THAN 3 MONTHS DURATION: ICD-10-CM

## 2025-05-15 DIAGNOSIS — M47.816 LUMBAR SPONDYLOSIS: Primary | ICD-10-CM

## 2025-05-15 DIAGNOSIS — G89.29 CHRONIC BACK PAIN GREATER THAN 3 MONTHS DURATION: ICD-10-CM

## 2025-05-15 RX ORDER — ONDANSETRON 4 MG/1
4 TABLET, ORALLY DISINTEGRATING ORAL EVERY 8 HOURS PRN
COMMUNITY
Start: 2025-02-26

## 2025-05-15 NOTE — PROGRESS NOTES
Pain Management Clinic    Subjective:     Chief Complaint: Post-op Evaluation (Post op Harvey MBB L1-2 5/7/25 C/O pain level 1, states sees a huge improvement in pain relief, Taking tylenol for pain.)    Referred by: Dale Norman MD     History of Present Illness:   Justin Del Cid is a 74 y.o. male who returns to clinic today for follow up of his chronic low back pain.  He underwent bilateral L1-2 medial branch blocks last week on May 7.  He states that it started working almost immediately, and he is getting almost complete 100% relief of his pain at this time.  He rates his pain as just 1/2 on the NRS today.  He is very pleased with the relief he is getting right now.          Interventional Pain History  07/17/2024:  Right RFA C4-C6  05/01/2024: Left RFA C4-C6  04/17/2024:  Bilateral MBB C4-C6  04/01/2024: Bilateral MBB C4-C6  10/11/2023: KARLY C7-T1    Review of Systems   All other systems reviewed and are negative.  : Neck pain +bilateral trapezius pain      Past Medical History:   Diagnosis Date    Anxiety     COPD (chronic obstructive pulmonary disease)     Coronary artery disease 2004    ANGIOPLASTY    GERD (gastroesophageal reflux disease)     Hyperlipidemia     Hypertension     Neck pain     Obesity, unspecified     BMI  31.32    Prostate enlargement     Sinusitis     Unspecified glaucoma          Past Surgical History:   Procedure Laterality Date    ABDOMINAL SURGERY      CARDIAC CATHETERIZATION      ANGIOPLASTY    CATARACT EXTRACTION W/  INTRAOCULAR LENS IMPLANT Bilateral     COLONOSCOPY      EPIDURAL STEROID INJECTION INTO CERVICAL SPINE N/A 10/11/2023    Procedure: Injection-steroid-epidural-cervical;  Surgeon: Nicky Blair MD;  Location: [facility];  Service: Pain Management;  Laterality: N/A;  C7 -T1    HERNIA REPAIR      INJECTION OF ANESTHETIC AGENT AROUND MEDIAL BRANCH NERVES INNERVATING CERVICAL FACET JOINT Bilateral 04/01/2024    Procedure: BLOCK, NERVE, FACET JOINT, CERVICAL, MEDIAL  BRANCH  Bilateral c4 c6;  Surgeon: Nicky Blair MD;  Location: [facility] OR;  Service: Pain Management;  Laterality: Bilateral;  Bilateral MBB C4-C6    INJECTION OF ANESTHETIC AGENT AROUND MEDIAL BRANCH NERVES INNERVATING CERVICAL FACET JOINT Bilateral 04/17/2024    Procedure: Block-nerve-medial branch-cervical;  Surgeon: Nicky Blair MD;  Location: [facility] OR;  Service: Pain Management;  Laterality: Bilateral;  Bilateral MBB C4-C6 ([facility])    INJECTION OF ANESTHETIC AGENT AROUND MEDIAL BRANCH NERVES INNERVATING LUMBAR FACET JOINT Bilateral 5/7/2025    Procedure: BLOCK, NERVE, FACET JOINT, LUMBAR, MEDIAL BRANCH /Bilateral MBB L1-2 ([facility]);  Surgeon: Nicky Blair MD;  Location: [facility] OR;  Service: Pain Management;  Laterality: Bilateral;  Bilateral MBB L1-2 ([facility])    RADIOFREQUENCY ABLATION Left 05/01/2024    Procedure: Radiofrequency Ablation;  Surgeon: Nicky Blair MD;  Location: [facility] OR;  Service: Pain Management;  Laterality: Left;  RFA Lt C4-C6 ([facility])    RADIOFREQUENCY ABLATION Right 07/10/2024    Procedure: Radiofrequency Ablation;  Surgeon: Nicky Blair MD;  Location: [facility] OR;  Service: Pain Management;  Laterality: Right;  Right RFA C4-C6 ([facility])    STENT PLACEMENT/PRIOR TO YOSELIN      3x heart         Family History   Problem Relation Name Age of Onset    No Known Problems Mother      Cancer Father      COPD Sister           Social History     Socioeconomic History    Marital status:    Tobacco Use    Smoking status: Every Day     Current packs/day: 1.00     Average packs/day: 1 pack/day for 50.4 years (50.4 ttl pk-yrs)     Types: Cigarettes     Start date: 1975    Smokeless tobacco: Never   Substance and Sexual Activity    Alcohol use: Not Currently    Drug use: Never    Sexual activity: Yes         Current Outpatient Medications   Medication Instructions    acetaminophen (TYLENOL) 500 mg, Every 6 hours PRN    albuterol (PROVENTIL) 2.5 mg, Every 6 hours PRN    ALBUTEROL INHL 2 puffs, Every 4 hours PRN     amLODIPine (NORVASC) 10 mg, Daily    amlodipine-benazepril 10-20mg (LOTREL) 10-20 mg per capsule 1 capsule, Daily    aspirin 325 MG tablet 1 tablet Orally Once a day    cetirizine (ZYRTEC) 10 mg, Daily    clopidogreL (PLAVIX) 75 mg tablet 1 tablet Orally Once a day for 30 day(s)    EScitalopram oxalate (LEXAPRO) 20 mg, As needed (PRN)    fluticasone-salmeterol diskus inhaler 100-50 mcg 1 puff, 2 times daily PRN    fluticasone-umeclidin-vilanter (TRELEGY ELLIPTA) 100-62.5-25 mcg DsDv 1 puff    losartan (COZAAR) 100 mg, Daily    meclizine (ANTIVERT) 25 mg tablet     metoprolol succinate (TOPROL-XL) 25 mg, Daily    naproxen sodium (ANAPROX) 220 mg, 2 times daily with meals    omeprazole (PRILOSEC) 40 mg, Daily    ondansetron (ZOFRAN-ODT) 4 mg, Every 8 hours PRN    rosuvastatin (CRESTOR) 20 MG tablet 1 tablet Orally Once a day    sildenafiL (VIAGRA) 50 mg    tamsulosin (FLOMAX) 0.4 mg, Nightly    triamcinolone acetonide 0.1% (KENALOG) 0.1 % cream     vit A/vit C/vit E/zinc/copper (PRESERVISION AREDS ORAL) Take by mouth.           Review of patient's allergies indicates:   Allergen Reactions    Iodine      Other Reaction(s): Not available, Unknown    Gabapentin      Bad emotional side effects.    Shrimp Rash          MRI lumbar spine 05/2024   (located in  per Dr. Alessandro Murray is a referral)  IMPRESSION:   One.  Mild L1-L2 posterior annular bulging.    2.   L2-L3 : Significant facet disease L 2-L3.  Mild Posterior annular bulging with a very small right lateral recess disc protrusion and a small left lateral recess/foraminal disc protrusion.  Significant facet disease  3.  L3-L4 very small right lateral recess/foraminal and small left lateral recess/foraminal disc protrusions.    Four.  Mild L4-L5 posterior annular bulging with a small left foraminal disc protrusion.    Five.  Mild L5-S1 posterior annular bulging with a small central/paracentral disc protrusion.    6.  Fusiform infrarenal abdominal  "aortic aneurysm measuring 3.3 cm.       Objective:        Vitals:    05/15/25 0954 05/15/25 0959   BP: 129/75    Pulse: 74    Weight: 82.6 kg (182 lb)    Height: 5' 7" (1.702 m)    PainSc:    1       Pain Disability Index (PDI): 7         Physical Exam  General: Well developed; normal weight. ; A&O x 3; No anxiety/depression; NAD  Mental Status: Oriented to person, palce and time. Displays appropriate mood & affect.  Head: Norm cephalic and atraumatic  Neck:  + bilateral cervical paraspinal banding. Full ROM neck turning to the right some discomfort remains on the right cervical axial spine.Full range of motion with lateral turning to right and with cervical flexion extension.able to raise both arms over head without pain   Eyes: normal conjunctiva, normal lids, normal pupils  ENT and mouth: normal external ear, nose, and no lesions noted on the lips.  Respiratory: Symmetrical, Unlabored. No dyspnea  CV: normal rhythm and rate. No peripheral edema.   Abdomen: Non-distended     Extremities:  Gen: No cyanosis or tenderness to palpation bilateral upper and lower extremities  Skin: Warm, pink, dry, no rashes, no lesions on the lumbar spine  Strength: 5/5 motor strength bilateral upper and lower extremities  ROM: Full ROM in bilateral knees  without pain or instability.     Neuro:  Gait: no altalgic lean, normal toe and heel raise. Independent ambulator.  DTR's: 2+ in bilateral patellar, and ankle  Sensory: Intact to light touch bilateral  upper and lower extremities     Spine: Normal lordosis. No scoliosis  L-spine ROM:  Limited and painful ROM to flexion, extension, bilateral rotation,   Straight Leg Raise positive bilaterally  SI Joint: No tenderness to palpation bilaterally.          Assessment:        Encounter Diagnoses   Name Primary?    Lumbar spondylosis Yes    Chronic back pain greater than 3 months duration                Plan:       Justin was seen today for post-op evaluation.    Diagnoses and all orders for " this visit:    Lumbar spondylosis    Chronic back pain greater than 3 months duration       He is getting excellent relief after undergoing bilateral L1-2 medial branch blocks last week.  His pain has almost resolved.  I will plan to follow up with him again in 3 months.  We discussed that we can either continue to repeat the injections depending on how long his relief last, or moved to radiofrequency ablation.  .  .  .  .

## 2025-07-03 DIAGNOSIS — M47.816 LUMBAR SPONDYLOSIS: Primary | ICD-10-CM

## 2025-07-03 DIAGNOSIS — M47.816 LUMBAR FACET ARTHROPATHY: ICD-10-CM

## 2025-07-03 DIAGNOSIS — M54.9 CHRONIC BACK PAIN GREATER THAN 3 MONTHS DURATION: ICD-10-CM

## 2025-07-03 DIAGNOSIS — G89.29 CHRONIC BACK PAIN GREATER THAN 3 MONTHS DURATION: ICD-10-CM

## 2025-07-03 NOTE — H&P (VIEW-ONLY)
Pain Management Clinic  Pre-Operative Clinic Note      SUBJECTIVE    CHIEF COMPLAINT: Pre-op Exam (Pre op procedure 7/16/25 C/o pain level 8, Taking tylenol for pain)       History of Present Illness: 74 y.o. male presents today for preoperative evaluation for bilateral L1-2 diagnostic bilateral medial branch blocks on 07-16/2025. I reviewed the indications for procedure. The risks and benefits of the proposed and alternative treatments were discussed with the patient. Questions pertinent to the procedure were solicited and answered. No assurances were given. Informed consent was obtained. The patient expressed good understanding and wished to proceed with scheduling the procedure.     Review of Systems:   Constitutional: No fever, weakness, or fatigue.   Ear/Nose/Mouth/Throat: No nasal congestion or sore throat.   Respiratory: No shortness of breath or cough.   Cardiovascular: No chest pain, palpitations, or peripheral edema.   Gastrointestinal: No nausea, vomiting, or abdominal pain.   Genitourinary: No dysuria.  Musculoskeletal: Justin Del Cid is a 74 y.o. male who returns to clinic today for follow up of his chronic low back pain.  He underwen his 1st diagnostic t bilateral L1-2 medial branch blocks last week on May 7.  He states that it started working almost immediately, and he is getting almost complete 100% relief of his pain at this time.  He rates his pain as just 1/2 on the NRS today.  He is very pleased with the relief he is getting right now..  He would like to proceed with his 2nd diagnostic medial branch blocks on 06/07-16/2020       Past Surgical History:   Procedure Laterality Date    ABDOMINAL SURGERY      CARDIAC CATHETERIZATION      ANGIOPLASTY    CATARACT EXTRACTION W/  INTRAOCULAR LENS IMPLANT Bilateral     COLONOSCOPY      EPIDURAL STEROID INJECTION INTO CERVICAL SPINE N/A 10/11/2023    Procedure: Injection-steroid-epidural-cervical;  Surgeon: Nicky Blair MD;  Location: [facility] OR;   "Service: Pain Management;  Laterality: N/A;  C7 -T1    HERNIA REPAIR      INJECTION OF ANESTHETIC AGENT AROUND MEDIAL BRANCH NERVES INNERVATING CERVICAL FACET JOINT Bilateral 04/01/2024    Procedure: BLOCK, NERVE, FACET JOINT, CERVICAL, MEDIAL BRANCH  Bilateral c4 c6;  Surgeon: Nicky Blair MD;  Location: [facility] OR;  Service: Pain Management;  Laterality: Bilateral;  Bilateral MBB C4-C6    INJECTION OF ANESTHETIC AGENT AROUND MEDIAL BRANCH NERVES INNERVATING CERVICAL FACET JOINT Bilateral 04/17/2024    Procedure: Block-nerve-medial branch-cervical;  Surgeon: Nicky Blair MD;  Location: [facility] OR;  Service: Pain Management;  Laterality: Bilateral;  Bilateral MBB C4-C6 ([facility])    INJECTION OF ANESTHETIC AGENT AROUND MEDIAL BRANCH NERVES INNERVATING LUMBAR FACET JOINT Bilateral 5/7/2025    Procedure: BLOCK, NERVE, FACET JOINT, LUMBAR, MEDIAL BRANCH /Bilateral MBB L1-2 ([facility]);  Surgeon: Nicky Blair MD;  Location: [facility] OR;  Service: Pain Management;  Laterality: Bilateral;  Bilateral MBB L1-2 ([facility])    RADIOFREQUENCY ABLATION Left 05/01/2024    Procedure: Radiofrequency Ablation;  Surgeon: Nicky Blair MD;  Location: [facility] OR;  Service: Pain Management;  Laterality: Left;  RFA Lt C4-C6 ([facility])    RADIOFREQUENCY ABLATION Right 07/10/2024    Procedure: Radiofrequency Ablation;  Surgeon: Nicky Blair MD;  Location: [facility] OR;  Service: Pain Management;  Laterality: Right;  Right RFA C4-C6 ([facility])    STENT PLACEMENT/PRIOR TO YOSELIN      3x heart        Past Medical History:   Diagnosis Date    Anxiety     COPD (chronic obstructive pulmonary disease)     Coronary artery disease 2004    ANGIOPLASTY    GERD (gastroesophageal reflux disease)     Hyperlipidemia     Hypertension     Neck pain     Obesity, unspecified     BMI  31.32    Prostate enlargement     Sinusitis     Unspecified glaucoma         OBJECTIVE:    Visit Vitals  BP (!) 145/84 (BP Location: Right arm, Patient Position: Sitting)   Pulse 76   Ht 5' 7" (1.702 m) "   Wt 83.9 kg (185 lb)   BMI 28.98 kg/m²     Vitals:    07/08/25 0853   PainSc:   8       Physical Exam:   General: Well-developed, well-nourished.  Neuro: Alert and oriented x 3.  Psych: Normal mood and affect.  HEENT: Normocephalic. PERRLA EOM intact. Nose and throat clear.  Heart:  No adventitious sounds.  Normal heart sounds regular rate and rhythm    Lungs: Clear to auscultation and percussion.    Abdomen: Soft non-tender. Bowel sounds positive. No rebound tenderness.  Skin: No rashes or open wounds  Musculoskeletal:General: Well developed; normal weight. ; A&O x 3; No anxiety/depression; NAD  Extremities:    Skin: Warm, pink, dry, no rashes, no lesions on the lumbar spine.  No wounds  Strength: 5/5 motor strength bilateral upper and lower extremities  ROM: Full ROM in bilateral knees  without pain or instability.     Neuro:  Gait: no altalgic lean, normal toe and heel raise. Independent ambulator.  DTR's: 2+ in bilateral patellar, and ankle  Sensory: Intact to light touch bilateral  upper and lower extremities     Spine: Normal lordosis. No scoliosis  L-spine ROM:  Limited and painful ROM to flexion, extension, bilateral rotation,   Straight Leg Raise positive bilaterally  SI Joint: No tenderness to palpation bilaterally.    ASSESSMENT:  1. Lumbar facet arthropathy    2. Chronic back pain greater than 3 months duration    3. Lumbar spondylosis       PLAN:  Plan for to proceed with bilateral L1-2 diagnostic bilateral medial branch blocks on 0 7-16/2025.   [x] The patient has been given preoperative instructions and prescriptions for post-operative medication.   [x] Medications to hold:  Plavix 5-7 and aspirin days prior to procedure and resume postop.  Clearance located in [employer].   confirmed understanding of when to hold his Plavix and aspirin.  [] Cardiac clearance received and reviewed.  [x] Post-operative appointment is scheduled for 2 weeks.

## 2025-07-03 NOTE — PROGRESS NOTES
Pain Management Clinic  Pre-Operative Clinic Note      SUBJECTIVE    CHIEF COMPLAINT: Pre-op Exam (Pre op procedure 7/16/25 C/o pain level 8, Taking tylenol for pain)       History of Present Illness: 74 y.o. male presents today for preoperative evaluation for bilateral L1-2 diagnostic bilateral medial branch blocks on 07-16/2025. I reviewed the indications for procedure. The risks and benefits of the proposed and alternative treatments were discussed with the patient. Questions pertinent to the procedure were solicited and answered. No assurances were given. Informed consent was obtained. The patient expressed good understanding and wished to proceed with scheduling the procedure.     Review of Systems:   Constitutional: No fever, weakness, or fatigue.   Ear/Nose/Mouth/Throat: No nasal congestion or sore throat.   Respiratory: No shortness of breath or cough.   Cardiovascular: No chest pain, palpitations, or peripheral edema.   Gastrointestinal: No nausea, vomiting, or abdominal pain.   Genitourinary: No dysuria.  Musculoskeletal: Justin Del Cid is a 74 y.o. male who returns to clinic today for follow up of his chronic low back pain.  He underwen his 1st diagnostic t bilateral L1-2 medial branch blocks last week on May 7.  He states that it started working almost immediately, and he is getting almost complete 100% relief of his pain at this time.  He rates his pain as just 1/2 on the NRS today.  He is very pleased with the relief he is getting right now..  He would like to proceed with his 2nd diagnostic medial branch blocks on 06/07-16/2020       Past Surgical History:   Procedure Laterality Date    ABDOMINAL SURGERY      CARDIAC CATHETERIZATION      ANGIOPLASTY    CATARACT EXTRACTION W/  INTRAOCULAR LENS IMPLANT Bilateral     COLONOSCOPY      EPIDURAL STEROID INJECTION INTO CERVICAL SPINE N/A 10/11/2023    Procedure: Injection-steroid-epidural-cervical;  Surgeon: Nicky Blair MD;  Location: [facility] OR;   "Service: Pain Management;  Laterality: N/A;  C7 -T1    HERNIA REPAIR      INJECTION OF ANESTHETIC AGENT AROUND MEDIAL BRANCH NERVES INNERVATING CERVICAL FACET JOINT Bilateral 04/01/2024    Procedure: BLOCK, NERVE, FACET JOINT, CERVICAL, MEDIAL BRANCH  Bilateral c4 c6;  Surgeon: Nicky Blair MD;  Location: [facility] OR;  Service: Pain Management;  Laterality: Bilateral;  Bilateral MBB C4-C6    INJECTION OF ANESTHETIC AGENT AROUND MEDIAL BRANCH NERVES INNERVATING CERVICAL FACET JOINT Bilateral 04/17/2024    Procedure: Block-nerve-medial branch-cervical;  Surgeon: Nicky Blair MD;  Location: [facility] OR;  Service: Pain Management;  Laterality: Bilateral;  Bilateral MBB C4-C6 ([facility])    INJECTION OF ANESTHETIC AGENT AROUND MEDIAL BRANCH NERVES INNERVATING LUMBAR FACET JOINT Bilateral 5/7/2025    Procedure: BLOCK, NERVE, FACET JOINT, LUMBAR, MEDIAL BRANCH /Bilateral MBB L1-2 ([facility]);  Surgeon: Nicky Blair MD;  Location: [facility] OR;  Service: Pain Management;  Laterality: Bilateral;  Bilateral MBB L1-2 ([facility])    RADIOFREQUENCY ABLATION Left 05/01/2024    Procedure: Radiofrequency Ablation;  Surgeon: Nicky Blair MD;  Location: [facility] OR;  Service: Pain Management;  Laterality: Left;  RFA Lt C4-C6 ([facility])    RADIOFREQUENCY ABLATION Right 07/10/2024    Procedure: Radiofrequency Ablation;  Surgeon: Nicky Blair MD;  Location: [facility] OR;  Service: Pain Management;  Laterality: Right;  Right RFA C4-C6 ([facility])    STENT PLACEMENT/PRIOR TO YOSELIN      3x heart        Past Medical History:   Diagnosis Date    Anxiety     COPD (chronic obstructive pulmonary disease)     Coronary artery disease 2004    ANGIOPLASTY    GERD (gastroesophageal reflux disease)     Hyperlipidemia     Hypertension     Neck pain     Obesity, unspecified     BMI  31.32    Prostate enlargement     Sinusitis     Unspecified glaucoma         OBJECTIVE:    Visit Vitals  BP (!) 145/84 (BP Location: Right arm, Patient Position: Sitting)   Pulse 76   Ht 5' 7" (1.702 m) "   Wt 83.9 kg (185 lb)   BMI 28.98 kg/m²     Vitals:    07/08/25 0853   PainSc:   8       Physical Exam:   General: Well-developed, well-nourished.  Neuro: Alert and oriented x 3.  Psych: Normal mood and affect.  HEENT: Normocephalic. PERRLA EOM intact. Nose and throat clear.  Heart:  No adventitious sounds.  Normal heart sounds regular rate and rhythm    Lungs: Clear to auscultation and percussion.    Abdomen: Soft non-tender. Bowel sounds positive. No rebound tenderness.  Skin: No rashes or open wounds  Musculoskeletal:General: Well developed; normal weight. ; A&O x 3; No anxiety/depression; NAD  Extremities:    Skin: Warm, pink, dry, no rashes, no lesions on the lumbar spine.  No wounds  Strength: 5/5 motor strength bilateral upper and lower extremities  ROM: Full ROM in bilateral knees  without pain or instability.     Neuro:  Gait: no altalgic lean, normal toe and heel raise. Independent ambulator.  DTR's: 2+ in bilateral patellar, and ankle  Sensory: Intact to light touch bilateral  upper and lower extremities     Spine: Normal lordosis. No scoliosis  L-spine ROM:  Limited and painful ROM to flexion, extension, bilateral rotation,   Straight Leg Raise positive bilaterally  SI Joint: No tenderness to palpation bilaterally.    ASSESSMENT:  1. Lumbar facet arthropathy    2. Chronic back pain greater than 3 months duration    3. Lumbar spondylosis       PLAN:  Plan for to proceed with bilateral L1-2 diagnostic bilateral medial branch blocks on 0 7-16/2025.   [x] The patient has been given preoperative instructions and prescriptions for post-operative medication.   [x] Medications to hold:  Plavix 5-7 and aspirin days prior to procedure and resume postop.  Clearance located in [employer].   confirmed understanding of when to hold his Plavix and aspirin.  [] Cardiac clearance received and reviewed.  [x] Post-operative appointment is scheduled for 2 weeks.

## 2025-07-08 ENCOUNTER — OFFICE VISIT (OUTPATIENT)
Facility: CLINIC | Age: 75
End: 2025-07-08
Payer: OTHER GOVERNMENT

## 2025-07-08 VITALS
WEIGHT: 185 LBS | SYSTOLIC BLOOD PRESSURE: 145 MMHG | HEART RATE: 76 BPM | HEIGHT: 67 IN | DIASTOLIC BLOOD PRESSURE: 84 MMHG | BODY MASS INDEX: 29.03 KG/M2

## 2025-07-08 DIAGNOSIS — M47.816 LUMBAR SPONDYLOSIS: ICD-10-CM

## 2025-07-08 DIAGNOSIS — M47.816 LUMBAR FACET ARTHROPATHY: Primary | ICD-10-CM

## 2025-07-08 DIAGNOSIS — G89.29 CHRONIC BACK PAIN GREATER THAN 3 MONTHS DURATION: ICD-10-CM

## 2025-07-08 DIAGNOSIS — M54.9 CHRONIC BACK PAIN GREATER THAN 3 MONTHS DURATION: ICD-10-CM

## 2025-07-08 PROCEDURE — 99213 OFFICE O/P EST LOW 20 MIN: CPT | Mod: ,,, | Performed by: NURSE PRACTITIONER

## 2025-07-16 ENCOUNTER — HOSPITAL ENCOUNTER (OUTPATIENT)
Facility: HOSPITAL | Age: 75
Discharge: HOME | End: 2025-07-16
Attending: ANESTHESIOLOGY | Admitting: ANESTHESIOLOGY
Payer: OTHER GOVERNMENT

## 2025-07-16 DIAGNOSIS — G89.29 CHRONIC BACK PAIN GREATER THAN 3 MONTHS DURATION: Primary | ICD-10-CM

## 2025-07-16 DIAGNOSIS — M54.9 CHRONIC BACK PAIN GREATER THAN 3 MONTHS DURATION: Primary | ICD-10-CM

## 2025-07-16 PROCEDURE — 63600175 PHARM REV CODE 636 W HCPCS: Performed by: ANESTHESIOLOGY

## 2025-07-16 PROCEDURE — 64493 INJ PARAVERT F JNT L/S 1 LEV: CPT | Mod: 50 | Performed by: ANESTHESIOLOGY

## 2025-07-16 PROCEDURE — 64493 INJ PARAVERT F JNT L/S 1 LEV: CPT | Mod: 50,KX,, | Performed by: ANESTHESIOLOGY

## 2025-07-16 PROCEDURE — 25000003 PHARM REV CODE 250: Performed by: ANESTHESIOLOGY

## 2025-07-16 RX ORDER — BUPIVACAINE HYDROCHLORIDE 2.5 MG/ML
INJECTION, SOLUTION EPIDURAL; INFILTRATION; INTRACAUDAL; PERINEURAL
Status: DISCONTINUED | OUTPATIENT
Start: 2025-07-16 | End: 2025-07-16 | Stop reason: HOSPADM

## 2025-07-16 RX ORDER — BUPIVACAINE HYDROCHLORIDE 2.5 MG/ML
INJECTION, SOLUTION EPIDURAL; INFILTRATION; INTRACAUDAL; PERINEURAL
Status: DISCONTINUED
Start: 2025-07-16 | End: 2025-07-16 | Stop reason: HOSPADM

## 2025-07-16 RX ORDER — LIDOCAINE HYDROCHLORIDE 10 MG/ML
INJECTION, SOLUTION EPIDURAL; INFILTRATION; INTRACAUDAL; PERINEURAL
Status: DISCONTINUED
Start: 2025-07-16 | End: 2025-07-16 | Stop reason: HOSPADM

## 2025-07-16 RX ORDER — LIDOCAINE HYDROCHLORIDE 10 MG/ML
INJECTION, SOLUTION EPIDURAL; INFILTRATION; INTRACAUDAL; PERINEURAL
Status: DISCONTINUED | OUTPATIENT
Start: 2025-07-16 | End: 2025-07-16 | Stop reason: HOSPADM

## 2025-07-16 RX ORDER — DIAZEPAM 5 MG/1
10 TABLET ORAL
Status: DISCONTINUED | OUTPATIENT
Start: 2025-07-16 | End: 2025-07-16 | Stop reason: HOSPADM

## 2025-07-16 RX ADMIN — DIAZEPAM 10 MG: 5 TABLET ORAL at 08:07

## 2025-07-16 NOTE — OP NOTE
Bilateral L1-2 diagnostic medial branch blocks    Pre-Procedure Diagnoses:  1. Chronic pain syndrome  2. Chronic Low back pain  3. Lumbar facet arthropathy    Post-Procedure Diagnoses:  1. Chronic pain syndrome  2. Chronic Low back pain  3. Lumbar facet arthropathy    Anesthesia:  Local     Estimated Blood Loss:  None    Complications:  None    Informed Consent:  The procedure, risks, benefits, and alternatives were discussed with the patient. There were no contraindications to the procedure. The patient expressed understanding and agreed to proceed. Fully informed written consent was obtained.     Description of the Procedure:  The patient was taken to the operating room. IV access was obtained prior to the start of the procedure. The patient was positioned prone on the fluoroscopy table. Continuous hemodynamic monitoring was initiated and continued throughout the duration of the procedure. The skin overlying the lumbosacral spine was prepped with Chloraprep and draped into a sterile field. Fluoroscopy was used to identify the locations of the left side L1 and L2 medial branch nerves at the junctions of the superior articular processes and transverse processes of L2 and L3, respectively. Skin anesthesia was achieved using 0.5 mL of 1% lidocaine over each injection site. A 22-gauge 3.5-inch Quinke spinal needle was slowly inserted and advanced under intermittent fluoroscopic guidance until it made bony contact at the target sites. Proper needle position was confirmed under AP, oblique, and lateral fluoroscopic views. Negative aspiration for blood or CSF was confirmed. Then 0.5 mL of 0.25% bupivacaine was easily injected at each level. There was no pain on injection. The needles were removed intact and bleeding was nil. The same procedure was repeated in identical fashion on the right side. Sterile bandages were applied. The patient was taken to the recovery room for further observation in stable condition. The  patient was then discharged home without any complications.

## 2025-07-16 NOTE — DISCHARGE SUMMARY
[facility] - Periop Services  Discharge Note  Short Stay    Procedure(s) (LRB):  BLOCK, NERVE, FACET JOINT, LUMBAR, MEDIAL BRANCH /MBB Bilateral L1-2 (Bilateral)      OUTCOME: Patient tolerated treatment/procedure well without complication and is now ready for discharge.    DISPOSITION: Home or Self Care    FINAL DIAGNOSIS:  <principal problem not specified>    FOLLOWUP: In clinic    DISCHARGE INSTRUCTIONS:  No discharge procedures on file.     TIME SPENT ON DISCHARGE: 5 minutes

## 2025-07-17 VITALS
HEART RATE: 73 BPM | OXYGEN SATURATION: 96 % | BODY MASS INDEX: 28.28 KG/M2 | DIASTOLIC BLOOD PRESSURE: 73 MMHG | SYSTOLIC BLOOD PRESSURE: 123 MMHG | RESPIRATION RATE: 16 BRPM | TEMPERATURE: 98 F | WEIGHT: 180.56 LBS

## 2025-07-23 ENCOUNTER — OFFICE VISIT (OUTPATIENT)
Facility: CLINIC | Age: 75
End: 2025-07-23
Payer: OTHER GOVERNMENT

## 2025-07-23 VITALS
BODY MASS INDEX: 28.25 KG/M2 | HEART RATE: 78 BPM | WEIGHT: 180 LBS | SYSTOLIC BLOOD PRESSURE: 113 MMHG | DIASTOLIC BLOOD PRESSURE: 70 MMHG | OXYGEN SATURATION: 96 % | HEIGHT: 67 IN

## 2025-07-23 DIAGNOSIS — M47.816 LUMBAR FACET ARTHROPATHY: ICD-10-CM

## 2025-07-23 DIAGNOSIS — G89.29 CHRONIC BILATERAL LOW BACK PAIN WITHOUT SCIATICA: ICD-10-CM

## 2025-07-23 DIAGNOSIS — G89.29 CHRONIC BACK PAIN GREATER THAN 3 MONTHS DURATION: Primary | ICD-10-CM

## 2025-07-23 DIAGNOSIS — M54.50 CHRONIC BILATERAL LOW BACK PAIN WITHOUT SCIATICA: ICD-10-CM

## 2025-07-23 DIAGNOSIS — M54.9 CHRONIC BACK PAIN GREATER THAN 3 MONTHS DURATION: Primary | ICD-10-CM

## 2025-07-23 PROCEDURE — 99214 OFFICE O/P EST MOD 30 MIN: CPT | Mod: ,,, | Performed by: NURSE PRACTITIONER

## 2025-07-23 NOTE — PROGRESS NOTES
[facility]    Subjective:     Chief Complaint: Low-back Pain (post op bilateral L1-L2 MBB /Patient states he had relief for 4 days with 80-85%/Pain level today is 7/10/Pre op for Right L1-L2 RFA on 8/6/25)    Referred by: Mayra Wilburn He*     History of Present Illness: Justin Del Cid is is a pleasant 75-year-old established male patient presenting today as a postoperative follow-up for pain after receiving a 2nd set of bilateral L1-2 MBBs on 07/16/2025 during his 1st diagnostic medial branch blocks he had almost 100% of pain relief for greater than a day.  During this 2nd diagnostic radiofrequency ablation he r reported 80-90% relief of pain for a few days.  Now the pain has returned.  In summary,  received greater than 80% relief of pain on 2 separate occasions with his diagnostic bilateral medial branch blocks.  Now his pain is starting to return.  He would like to move forward with scheduling a right radiofrequency ablation L1-2.  His clearance is current in  from his cardiologist to hold his Plavix for 5-7 days prior to the procedure.  He is not interested in smoking cessation at this time.  His pain score today is a 7/10 on the NRS when he is more active standing doing yd work chores etc..      History of Present Illness:  During last office visit with Dr. Estelle amato on 05/15/2025 included the following:   Justin Del Cid is a 74 y.o. male who returns to clinic today for follow up of his chronic low back pain.  He underwent bilateral L1-2 medial branch blocks last week on May 7.  He states that it started working almost immediately, and he is getting almost complete 100% relief of his pain at this time.  He rates his pain as just 1/2 on the NRS today.  He is very pleased with the relief he is getting right now.    [facility]  He does not have a pacemaker, spinal cord stimulator or defibrillator.            Vital signs:   Visit Vitals  BP  "113/70 (BP Location: Left arm, Patient Position: Sitting)   Pulse 78   Ht 5' 7" (1.702 m)   Wt 81.6 kg (180 lb)   SpO2 96%   BMI 28.19 kg/m²      Vitals:    07/23/25 0857   PainSc:   7     Pain Disability Index (PDI): 47       Interventional Pain History  07/16/2025:  Bilateral L1-2 MBBs  05/07/2025:  Bilateral L 1-2 MBBs  07/17/2024:  Right RFA C4-C6  05/01/2024: Left RFA C4-C6  04/17/2024:  Bilateral MBB C4-C6  04/01/2024: Bilateral MBB C4-C6  10/11/2023: KARLY C7-T1    ROS: Back pain        MRI lumbar spine 05/2024   (located in  per Dr. Alessandro Murray is a referral)  IMPRESSION:   One.  Mild L1-L2 posterior annular bulging.    2.   L2-L3 : Significant facet disease L 2-L3.  Mild Posterior annular bulging with a very small right lateral recess disc protrusion and a small left lateral recess/foraminal disc protrusion.  Significant facet disease  3.  L3-L4 very small right lateral recess/foraminal and small left lateral recess/foraminal disc protrusions.    Four.  Mild L4-L5 posterior annular bulging with a small left foraminal disc protrusion.    Five.  Mild L5-S1 posterior annular bulging with a small central/paracentral disc protrusion.    6.  Fusiform infrarenal abdominal aortic aneurysm measuring 3.3 cm.        Objective:        Physical Exam  General: Well developed; normal weight. ; A&O x 3; No anxiety/depression; NAD  Mental Status: Oriented to person, palce and time. Displays appropriate mood & affect.  Head: Norm cephalic and atraumatic  Neck:  + bilateral cervical paraspinal banding. Full ROM neck turning to the right some discomfort remains on the right cervical axial spine.Full range of motion with lateral turning to right and with cervical flexion extension.able to raise both arms over head without pain   Eyes: normal conjunctiva, normal lids, normal pupils  ENT and mouth: normal external ear, nose, and n no lesions or open wounds.  Respiratory: Symmetrical, Unlabored. No dyspnea no " wheezing.  CV:  No adventitious heart sounds.    Abdomen: Non-distended     Extremities:  Gen: No cyanosis or tenderness to palpation bilateral upper and lower extremities  Skin: Warm, pink, dry, no rashes, no lesions on the lumbar spine  Strength: 5/5 motor strength bilateral upper and lower extremities  ROM: Full ROM in bilateral knees  without pain or instability.     Neuro:  Gait: no altalgic lean, normal toe and heel raise. Independent ambulator.  Sensory: Intact to light touch bilateral  upper and lower extremities  Spine: Normal lordosis. No scoliosis  L-spine ROM:  Limited and painful ROM to flexion, extension, bilateral rotation,   Straight Leg Raise positive bilaterally  SI Joint: No tenderness to palpation bilaterally.              Assessment:     Justin mcbride is a pleasant 75-year-old established male patient presenting today as a postoperative follow-up for pain after receiving a 2nd set of bilateral L1-2 MBBs on 07/16/2025 during his 1st diagnostic medial branch blocks he had almost 100% of pain relief for greater than a day.  During this 2nd diagnostic radiofrequency ablation he r reported 80-90% relief of pain for a few days.  Now the pain has returned.  In summary, austyn received greater than 80% relief of pain on 2 separate occasions with his diagnostic bilateral medial branch blocks.  Now his pain is starting to return.  He would like to move forward with scheduling a right radiofrequency ablation L1-2.  His clearance is current in  from his cardiologist to hold his Plavix for 5-7 days prior to the procedure.  He is not interested in smoking cessation at this time.  His pain score today is a 7/10 on the NRS when he is more active standing doing yd work chores etc..      Request sent for right L1-2 radiofrequency ablation  [place] instructed to hold[facility]av[facility]. [facility]  5-7 days prior to procedure and resume postop.  He verified understanding.  He is not  taking any other blood thinners or antiplatelets.    Plan of care:   Postoperative follow-up appointment in 2 weeks was made for  today.    Encounter Diagnoses   Name Primary?    Chronic back pain greater than 3 months duration Yes    Lumbar facet arthropathy     Chronic bilateral low back pain without sciatica          Plan:       Justin was seen today for low-back pain.    Diagnoses and all orders for this visit:    Chronic back pain greater than 3 months duration    Lumbar facet arthropathy    Chronic bilateral low back pain without sciatica           Past Medical History:   Diagnosis Date    Anxiety     COPD (chronic obstructive pulmonary disease)     Coronary artery disease 2004    ANGIOPLASTY    GERD (gastroesophageal reflux disease)     Hyperlipidemia     Hypertension     Neck pain     Obesity, unspecified     BMI  31.32    Prostate enlargement     Sinusitis     Unspecified glaucoma        Past Surgical History:   Procedure Laterality Date    ABDOMINAL SURGERY      CARDIAC CATHETERIZATION      ANGIOPLASTY    CATARACT EXTRACTION W/  INTRAOCULAR LENS IMPLANT Bilateral     COLONOSCOPY      EPIDURAL STEROID INJECTION INTO CERVICAL SPINE N/A 10/11/2023    Procedure: Injection-steroid-epidural-cervical;  Surgeon: Nicky Blair MD;  Location: [facility] OR;  Service: Pain Management;  Laterality: N/A;  C7 -T1    HERNIA REPAIR      INJECTION OF ANESTHETIC AGENT AROUND MEDIAL BRANCH NERVES INNERVATING CERVICAL FACET JOINT Bilateral 04/01/2024    Procedure: BLOCK, NERVE, FACET JOINT, CERVICAL, MEDIAL BRANCH  Bilateral c4 c6;  Surgeon: Nicky Blair MD;  Location: [facility] OR;  Service: Pain Management;  Laterality: Bilateral;  Bilateral MBB C4-C6    INJECTION OF ANESTHETIC AGENT AROUND MEDIAL BRANCH NERVES INNERVATING CERVICAL FACET JOINT Bilateral 04/17/2024    Procedure: Block-nerve-medial branch-cervical;  Surgeon: Nicky Blair MD;  Location: [facility] OR;  Service: Pain Management;  Laterality: Bilateral;   Bilateral MBB C4-C6 ([facility])    INJECTION OF ANESTHETIC AGENT AROUND MEDIAL BRANCH NERVES INNERVATING LUMBAR FACET JOINT Bilateral 5/7/2025    Procedure: BLOCK, NERVE, FACET JOINT, LUMBAR, MEDIAL BRANCH /Bilateral MBB L1-2 ([facility]);  Surgeon: Nicky Blair MD;  Location: [facility] OR;  Service: Pain Management;  Laterality: Bilateral;  Bilateral MBB L1-2 ([facility])    INJECTION OF ANESTHETIC AGENT AROUND MEDIAL BRANCH NERVES INNERVATING LUMBAR FACET JOINT Bilateral 7/16/2025    Procedure: BLOCK, NERVE, FACET JOINT, LUMBAR, MEDIAL BRANCH /MBB Bilateral L1-2;  Surgeon: Nicky Blair MD;  Location: [facility] OR;  Service: Pain Management;  Laterality: Bilateral;  MBB Bilateral L1-2    RADIOFREQUENCY ABLATION Left 05/01/2024    Procedure: Radiofrequency Ablation;  Surgeon: Nicky Blair MD;  Location: [facility] OR;  Service: Pain Management;  Laterality: Left;  RFA Lt C4-C6 ([facility])    RADIOFREQUENCY ABLATION Right 07/10/2024    Procedure: Radiofrequency Ablation;  Surgeon: Nicky Blair MD;  Location: [facility] OR;  Service: Pain Management;  Laterality: Right;  Right RFA C4-C6 ([facility])    STENT PLACEMENT/PRIOR TO YOSELIN      3x heart       Family History   Problem Relation Name Age of Onset    No Known Problems Mother      Cancer Father      COPD Sister         Social History     Socioeconomic History    Marital status:    Tobacco Use    Smoking status: Every Day     Current packs/day: 1.00     Average packs/day: 1 pack/day for 50.6 years (50.6 ttl pk-yrs)     Types: Cigarettes     Start date: 1975    Smokeless tobacco: Never   Vaping Use    Vaping status: Never Used   Substance and Sexual Activity    Alcohol use: Not Currently    Drug use: Never    Sexual activity: Yes       Current Medications[1]    Review of patient's allergies indicates:   Allergen Reactions    Iodine      Other Reaction(s): Not available, Unknown    Gabapentin      Bad emotional side effects.    Other Reaction(s): Not available    gabapentin    Shrimp Rash                   [1]   Current Outpatient Medications   Medication Sig Dispense Refill    acetaminophen (TYLENOL) 500 MG tablet Take 500 mg by mouth every 6 (six) hours as needed for Pain.      albuterol (PROVENTIL) 2.5 mg /3 mL (0.083 %) nebulizer solution Take 2.5 mg by nebulization every 6 (six) hours as needed for Wheezing. Rescue      ALBUTEROL INHL Inhale 2 puffs into the lungs every 4 (four) hours as needed.      amLODIPine (NORVASC) 10 MG tablet Take 10 mg by mouth once daily.      aspirin 325 MG tablet 1 tablet Orally Once a day      cetirizine (ZYRTEC) 10 MG tablet Take 10 mg by mouth once daily.      clopidogreL (PLAVIX) 75 mg tablet 1 tablet Orally Once a day for 30 day(s)      EScitalopram oxalate (LEXAPRO) 20 MG tablet Take 20 mg by mouth as needed.      fluticasone-salmeterol diskus inhaler 100-50 mcg Inhale 1 puff into the lungs 2 (two) times daily as needed. Controller      losartan (COZAAR) 100 MG tablet Take 100 mg by mouth once daily.      metoprolol succinate (TOPROL-XL) 25 MG 24 hr tablet Take 25 mg by mouth once daily.      omeprazole (PRILOSEC) 40 MG capsule Take 40 mg by mouth once daily.      ondansetron (ZOFRAN-ODT) 4 MG TbDL Take 4 mg by mouth every 8 (eight) hours as needed.      rosuvastatin (CRESTOR) 20 MG tablet 1 tablet Orally Once a day      sildenafiL (VIAGRA) 100 MG tablet 50 mg.      tamsulosin (FLOMAX) 0.4 mg Cap Take 0.4 mg by mouth nightly.      vit A/vit C/vit E/zinc/copper (PRESERVISION AREDS ORAL) Take by mouth.      naproxen sodium (ANAPROX) 220 MG tablet Take 220 mg by mouth 2 (two) times daily with meals. (Patient not taking: Reported on 7/23/2025)       No current facility-administered medications for this visit.

## 2025-08-06 ENCOUNTER — HOSPITAL ENCOUNTER (OUTPATIENT)
Facility: HOSPITAL | Age: 75
Discharge: HOME | End: 2025-08-06
Attending: ANESTHESIOLOGY | Admitting: ANESTHESIOLOGY
Payer: OTHER GOVERNMENT

## 2025-08-06 ENCOUNTER — ANESTHESIA (OUTPATIENT)
Dept: SURGERY | Facility: HOSPITAL | Age: 75
End: 2025-08-06
Payer: OTHER GOVERNMENT

## 2025-08-06 ENCOUNTER — ANESTHESIA EVENT (OUTPATIENT)
Dept: SURGERY | Facility: HOSPITAL | Age: 75
End: 2025-08-06
Payer: OTHER GOVERNMENT

## 2025-08-06 DIAGNOSIS — M54.9 CHRONIC BACK PAIN GREATER THAN 3 MONTHS DURATION: Primary | ICD-10-CM

## 2025-08-06 DIAGNOSIS — M47.816 LUMBAR FACET ARTHROPATHY: ICD-10-CM

## 2025-08-06 DIAGNOSIS — M54.50 CHRONIC BILATERAL LOW BACK PAIN WITHOUT SCIATICA: ICD-10-CM

## 2025-08-06 DIAGNOSIS — M47.816 LUMBAR SPONDYLOSIS: Primary | ICD-10-CM

## 2025-08-06 DIAGNOSIS — G89.29 CHRONIC BILATERAL LOW BACK PAIN WITHOUT SCIATICA: ICD-10-CM

## 2025-08-06 DIAGNOSIS — G89.29 CHRONIC BACK PAIN GREATER THAN 3 MONTHS DURATION: Primary | ICD-10-CM

## 2025-08-06 PROCEDURE — 63600175 PHARM REV CODE 636 W HCPCS: Performed by: ANESTHESIOLOGY

## 2025-08-06 PROCEDURE — 37000008 HC ANESTHESIA 1ST 15 MINUTES: Performed by: ANESTHESIOLOGY

## 2025-08-06 PROCEDURE — 63600175 PHARM REV CODE 636 W HCPCS: Performed by: NURSE ANESTHETIST, CERTIFIED REGISTERED

## 2025-08-06 PROCEDURE — 64635 DESTROY LUMB/SAC FACET JNT: CPT | Mod: RT,,, | Performed by: ANESTHESIOLOGY

## 2025-08-06 PROCEDURE — 64635 DESTROY LUMB/SAC FACET JNT: CPT | Mod: RT | Performed by: ANESTHESIOLOGY

## 2025-08-06 RX ORDER — BUPIVACAINE HYDROCHLORIDE 2.5 MG/ML
INJECTION, SOLUTION EPIDURAL; INFILTRATION; INTRACAUDAL; PERINEURAL
Status: DISCONTINUED | OUTPATIENT
Start: 2025-08-06 | End: 2025-08-06 | Stop reason: HOSPADM

## 2025-08-06 RX ORDER — LIDOCAINE HYDROCHLORIDE 10 MG/ML
INJECTION, SOLUTION EPIDURAL; INFILTRATION; INTRACAUDAL; PERINEURAL
Status: DISCONTINUED
Start: 2025-08-06 | End: 2025-08-06 | Stop reason: HOSPADM

## 2025-08-06 RX ORDER — LIDOCAINE HYDROCHLORIDE 10 MG/ML
1 INJECTION, SOLUTION EPIDURAL; INFILTRATION; INTRACAUDAL; PERINEURAL ONCE
OUTPATIENT
Start: 2025-08-06 | End: 2025-08-06

## 2025-08-06 RX ORDER — FENTANYL CITRATE 50 UG/ML
INJECTION, SOLUTION INTRAMUSCULAR; INTRAVENOUS
Status: DISCONTINUED | OUTPATIENT
Start: 2025-08-06 | End: 2025-08-06

## 2025-08-06 RX ORDER — ONDANSETRON HYDROCHLORIDE 2 MG/ML
4 INJECTION, SOLUTION INTRAVENOUS DAILY PRN
OUTPATIENT
Start: 2025-08-06

## 2025-08-06 RX ORDER — BUPIVACAINE HYDROCHLORIDE 2.5 MG/ML
INJECTION, SOLUTION EPIDURAL; INFILTRATION; INTRACAUDAL; PERINEURAL
Status: DISCONTINUED
Start: 2025-08-06 | End: 2025-08-06 | Stop reason: HOSPADM

## 2025-08-06 RX ORDER — GLUCAGON 1 MG
1 KIT INJECTION
OUTPATIENT
Start: 2025-08-06

## 2025-08-06 RX ORDER — TRIAMCINOLONE ACETONIDE 40 MG/ML
INJECTION, SUSPENSION INTRA-ARTICULAR; INTRAMUSCULAR
Status: DISCONTINUED
Start: 2025-08-06 | End: 2025-08-06 | Stop reason: HOSPADM

## 2025-08-06 RX ORDER — DIPHENHYDRAMINE HYDROCHLORIDE 50 MG/ML
25 INJECTION, SOLUTION INTRAMUSCULAR; INTRAVENOUS EVERY 6 HOURS PRN
OUTPATIENT
Start: 2025-08-06

## 2025-08-06 RX ORDER — LIDOCAINE HYDROCHLORIDE 10 MG/ML
INJECTION, SOLUTION EPIDURAL; INFILTRATION; INTRACAUDAL; PERINEURAL
Status: DISCONTINUED | OUTPATIENT
Start: 2025-08-06 | End: 2025-08-06

## 2025-08-06 RX ORDER — SODIUM CHLORIDE, SODIUM GLUCONATE, SODIUM ACETATE, POTASSIUM CHLORIDE AND MAGNESIUM CHLORIDE 30; 37; 368; 526; 502 MG/100ML; MG/100ML; MG/100ML; MG/100ML; MG/100ML
INJECTION, SOLUTION INTRAVENOUS CONTINUOUS
OUTPATIENT
Start: 2025-08-06 | End: 2025-09-05

## 2025-08-06 RX ORDER — LIDOCAINE HYDROCHLORIDE 10 MG/ML
INJECTION, SOLUTION INFILTRATION; PERINEURAL
Status: DISCONTINUED | OUTPATIENT
Start: 2025-08-06 | End: 2025-08-06 | Stop reason: HOSPADM

## 2025-08-06 RX ORDER — TRIAMCINOLONE ACETONIDE 40 MG/ML
INJECTION, SUSPENSION INTRA-ARTICULAR; INTRAMUSCULAR
Status: DISCONTINUED | OUTPATIENT
Start: 2025-08-06 | End: 2025-08-06 | Stop reason: HOSPADM

## 2025-08-06 RX ORDER — PROPOFOL 10 MG/ML
VIAL (ML) INTRAVENOUS
Status: DISCONTINUED | OUTPATIENT
Start: 2025-08-06 | End: 2025-08-06

## 2025-08-06 RX ADMIN — FENTANYL CITRATE 25 MCG: 50 INJECTION, SOLUTION INTRAMUSCULAR; INTRAVENOUS at 09:08

## 2025-08-06 RX ADMIN — PROPOFOL 40 MG: 10 INJECTION, EMULSION INTRAVENOUS at 09:08

## 2025-08-06 RX ADMIN — LIDOCAINE HYDROCHLORIDE 20 MG: 10 INJECTION, SOLUTION EPIDURAL; INFILTRATION; INTRACAUDAL; PERINEURAL at 09:08

## 2025-08-07 VITALS
RESPIRATION RATE: 16 BRPM | WEIGHT: 180.56 LBS | SYSTOLIC BLOOD PRESSURE: 111 MMHG | HEART RATE: 62 BPM | TEMPERATURE: 98 F | HEIGHT: 67 IN | OXYGEN SATURATION: 95 % | DIASTOLIC BLOOD PRESSURE: 65 MMHG | BODY MASS INDEX: 28.34 KG/M2

## 2025-08-19 ENCOUNTER — ANESTHESIA EVENT (OUTPATIENT)
Dept: SURGERY | Facility: HOSPITAL | Age: 75
End: 2025-08-19
Payer: OTHER GOVERNMENT

## 2025-08-20 ENCOUNTER — ANESTHESIA (OUTPATIENT)
Dept: SURGERY | Facility: HOSPITAL | Age: 75
End: 2025-08-20
Payer: OTHER GOVERNMENT

## 2025-08-20 ENCOUNTER — HOSPITAL ENCOUNTER (OUTPATIENT)
Facility: HOSPITAL | Age: 75
Discharge: HOME | End: 2025-08-20
Attending: ANESTHESIOLOGY | Admitting: ANESTHESIOLOGY
Payer: OTHER GOVERNMENT

## 2025-08-20 DIAGNOSIS — G89.29 CHRONIC BACK PAIN GREATER THAN 3 MONTHS DURATION: Primary | ICD-10-CM

## 2025-08-20 DIAGNOSIS — M54.9 CHRONIC BACK PAIN GREATER THAN 3 MONTHS DURATION: Primary | ICD-10-CM

## 2025-08-20 PROCEDURE — 64635 DESTROY LUMB/SAC FACET JNT: CPT | Mod: LT,,, | Performed by: ANESTHESIOLOGY

## 2025-08-20 PROCEDURE — 64635 DESTROY LUMB/SAC FACET JNT: CPT | Mod: LT | Performed by: ANESTHESIOLOGY

## 2025-08-20 PROCEDURE — 63600175 PHARM REV CODE 636 W HCPCS: Performed by: NURSE ANESTHETIST, CERTIFIED REGISTERED

## 2025-08-20 PROCEDURE — 63600175 PHARM REV CODE 636 W HCPCS: Performed by: ANESTHESIOLOGY

## 2025-08-20 PROCEDURE — 37000008 HC ANESTHESIA 1ST 15 MINUTES: Performed by: ANESTHESIOLOGY

## 2025-08-20 RX ORDER — TRIAMCINOLONE ACETONIDE 40 MG/ML
INJECTION, SUSPENSION INTRA-ARTICULAR; INTRAMUSCULAR
Status: DISCONTINUED | OUTPATIENT
Start: 2025-08-20 | End: 2025-08-20 | Stop reason: HOSPADM

## 2025-08-20 RX ORDER — BUPIVACAINE HYDROCHLORIDE 2.5 MG/ML
INJECTION, SOLUTION EPIDURAL; INFILTRATION; INTRACAUDAL; PERINEURAL
Status: DISCONTINUED | OUTPATIENT
Start: 2025-08-20 | End: 2025-08-20 | Stop reason: HOSPADM

## 2025-08-20 RX ORDER — LIDOCAINE HYDROCHLORIDE 10 MG/ML
INJECTION, SOLUTION EPIDURAL; INFILTRATION; INTRACAUDAL; PERINEURAL
Status: DISCONTINUED
Start: 2025-08-20 | End: 2025-08-20 | Stop reason: HOSPADM

## 2025-08-20 RX ORDER — BUPIVACAINE HYDROCHLORIDE 2.5 MG/ML
INJECTION, SOLUTION EPIDURAL; INFILTRATION; INTRACAUDAL; PERINEURAL
Status: DISCONTINUED
Start: 2025-08-20 | End: 2025-08-20 | Stop reason: HOSPADM

## 2025-08-20 RX ORDER — PROPOFOL 10 MG/ML
VIAL (ML) INTRAVENOUS
Status: DISCONTINUED | OUTPATIENT
Start: 2025-08-20 | End: 2025-08-20

## 2025-08-20 RX ORDER — LIDOCAINE HYDROCHLORIDE 10 MG/ML
INJECTION, SOLUTION INFILTRATION; PERINEURAL
Status: DISCONTINUED | OUTPATIENT
Start: 2025-08-20 | End: 2025-08-20 | Stop reason: HOSPADM

## 2025-08-20 RX ORDER — LIDOCAINE HYDROCHLORIDE 10 MG/ML
INJECTION, SOLUTION EPIDURAL; INFILTRATION; INTRACAUDAL; PERINEURAL
Status: DISCONTINUED | OUTPATIENT
Start: 2025-08-20 | End: 2025-08-20

## 2025-08-20 RX ORDER — TRIAMCINOLONE ACETONIDE 40 MG/ML
INJECTION, SUSPENSION INTRA-ARTICULAR; INTRAMUSCULAR
Status: DISCONTINUED
Start: 2025-08-20 | End: 2025-08-20 | Stop reason: HOSPADM

## 2025-08-20 RX ADMIN — PROPOFOL 70 MG: 10 INJECTION, EMULSION INTRAVENOUS at 08:08

## 2025-08-20 RX ADMIN — SODIUM CHLORIDE, POTASSIUM CHLORIDE, SODIUM LACTATE AND CALCIUM CHLORIDE: 600; 310; 30; 20 INJECTION, SOLUTION INTRAVENOUS at 08:08

## 2025-08-20 RX ADMIN — PROPOFOL 40 MG: 10 INJECTION, EMULSION INTRAVENOUS at 08:08

## 2025-08-20 RX ADMIN — LIDOCAINE HYDROCHLORIDE 20 MG: 10 INJECTION, SOLUTION EPIDURAL; INFILTRATION; INTRACAUDAL; PERINEURAL at 08:08

## 2025-08-21 VITALS
DIASTOLIC BLOOD PRESSURE: 61 MMHG | TEMPERATURE: 98 F | RESPIRATION RATE: 18 BRPM | WEIGHT: 175.94 LBS | HEIGHT: 68 IN | OXYGEN SATURATION: 98 % | BODY MASS INDEX: 26.66 KG/M2 | HEART RATE: 63 BPM | SYSTOLIC BLOOD PRESSURE: 95 MMHG

## 2025-08-22 ENCOUNTER — OFFICE VISIT (OUTPATIENT)
Facility: CLINIC | Age: 75
End: 2025-08-22
Payer: OTHER GOVERNMENT

## 2025-08-22 VITALS
DIASTOLIC BLOOD PRESSURE: 73 MMHG | BODY MASS INDEX: 27.62 KG/M2 | SYSTOLIC BLOOD PRESSURE: 131 MMHG | TEMPERATURE: 98 F | WEIGHT: 176 LBS | HEIGHT: 67 IN | HEART RATE: 74 BPM

## 2025-08-22 DIAGNOSIS — M54.9 CHRONIC BACK PAIN GREATER THAN 3 MONTHS DURATION: ICD-10-CM

## 2025-08-22 DIAGNOSIS — G89.29 CHRONIC BACK PAIN GREATER THAN 3 MONTHS DURATION: ICD-10-CM

## 2025-08-22 DIAGNOSIS — M47.816 LUMBAR FACET ARTHROPATHY: Primary | ICD-10-CM

## 2025-08-22 RX ORDER — FLUTICASONE FUROATE, UMECLIDINIUM BROMIDE AND VILANTEROL TRIFENATATE 100; 62.5; 25 UG/1; UG/1; UG/1
1 POWDER RESPIRATORY (INHALATION)
COMMUNITY

## 2025-08-22 RX ORDER — SILDENAFIL CITRATE 20 MG/1
TABLET ORAL
COMMUNITY

## 2025-08-22 RX ORDER — METHYLPREDNISOLONE 4 MG/1
TABLET ORAL
Qty: 21 EACH | Refills: 0 | Status: SHIPPED | OUTPATIENT
Start: 2025-08-22 | End: 2025-09-12

## 2025-09-04 ENCOUNTER — TELEPHONE (OUTPATIENT)
Facility: CLINIC | Age: 75
End: 2025-09-04
Payer: OTHER GOVERNMENT

## (undated) DEVICE — POSITIONER HEAD ADULT

## (undated) DEVICE — SET SMARTSITE EXT SMALLBORE NF

## (undated) DEVICE — SYR DISP LL 5CC

## (undated) DEVICE — GLOVE PROTEXIS PI CRM 6.5

## (undated) DEVICE — KIT SURGICAL TURNOVER

## (undated) DEVICE — SYR 3CC LUER LOC

## (undated) DEVICE — SYR W NDL BLN FILL 5ML 18GX1.5

## (undated) DEVICE — GLOVE SIGNATURE MICRO LTX 6.5

## (undated) DEVICE — SYR POSIFLUSH NACL PREFIL 10ML

## (undated) DEVICE — DRAPE MEDIUM SHEET 40X70IN

## (undated) DEVICE — Device

## (undated) DEVICE — NDL SYR 10ML 18X1.5 LL BLUNT

## (undated) DEVICE — NDL HYPO REG 25G X 1 1/2

## (undated) DEVICE — PAD ELECTROSURGICAL PAT PLATE

## (undated) DEVICE — SYR 10CC LUER LOCK

## (undated) DEVICE — TOWEL OR DISP STRL BLUE 4/PK

## (undated) DEVICE — NDL SAFETY 25G X 1.5 ECLIPSE

## (undated) DEVICE — DRAPE UTILITY W/ TAPE 20X30IN

## (undated) DEVICE — ADAPTER DUAL NSL LUER M-M 7FT

## (undated) DEVICE — NDL FLTR 5MCRN BLNT TIP 18GX1

## (undated) DEVICE — CHLORAPREP 10.5 ML APPLICATOR

## (undated) DEVICE — CANNULA AIRLIFE ETCO2 NSL 7FT

## (undated) DEVICE — NDL QUINCKE SPINAL 25G 3.5IN

## (undated) DEVICE — COVER TABLE HVY DTY 60X90IN

## (undated) DEVICE — BANDAGE SHEER STRIP 3/4X3IN

## (undated) DEVICE — NDL SPINAL 22GA 3.5 IN QUINCKE

## (undated) DEVICE — APPLICATOR CHLORAPREP ORN 26ML

## (undated) DEVICE — SYR 3ML LL 18GA 1.5IN

## (undated) DEVICE — DRAPE FULL SHEET 70X100IN

## (undated) DEVICE — CANNULA VENOM RF 18GX100MM

## (undated) DEVICE — COVER PROBE BLK PK NS 3.5X20CM

## (undated) DEVICE — NDL EPIDURAL TOUHY 18G X3.5

## (undated) DEVICE — NDL BLUNT FILL 18G 1IN

## (undated) DEVICE — SYR LUER-LOCK STERILE 5ML

## (undated) DEVICE — CONTRAST ISOVUE M 200 20ML VIL

## (undated) DEVICE — SYR EPILOR LUER-LOK LOR 7ML